# Patient Record
Sex: MALE | Race: ASIAN | Employment: FULL TIME | URBAN - METROPOLITAN AREA
[De-identification: names, ages, dates, MRNs, and addresses within clinical notes are randomized per-mention and may not be internally consistent; named-entity substitution may affect disease eponyms.]

---

## 2017-01-04 ENCOUNTER — APPOINTMENT (OUTPATIENT)
Dept: GENERAL RADIOLOGY | Age: 67
End: 2017-01-04
Attending: FAMILY MEDICINE

## 2017-01-04 ENCOUNTER — HOSPITAL ENCOUNTER (OUTPATIENT)
Age: 67
Discharge: HOME | End: 2017-01-04
Attending: FAMILY MEDICINE

## 2017-01-04 VITALS
TEMPERATURE: 98 F | RESPIRATION RATE: 18 BRPM | DIASTOLIC BLOOD PRESSURE: 82 MMHG | HEART RATE: 72 BPM | OXYGEN SATURATION: 98 % | SYSTOLIC BLOOD PRESSURE: 150 MMHG

## 2017-01-04 DIAGNOSIS — R29.898 SHOULDER WEAKNESS: Primary | ICD-10-CM

## 2017-01-04 DIAGNOSIS — S46.001A ROTATOR CUFF INJURY, RIGHT, INITIAL ENCOUNTER: ICD-10-CM

## 2017-01-04 PROCEDURE — 73030 X-RAY EXAM OF SHOULDER: CPT

## 2017-01-04 PROCEDURE — 99213 OFFICE O/P EST LOW 20 MIN: CPT

## 2017-01-04 PROCEDURE — 99203 OFFICE O/P NEW LOW 30 MIN: CPT

## 2017-01-04 RX ORDER — NAPROXEN 500 MG/1
500 TABLET ORAL 2 TIMES DAILY PRN
Qty: 20 TABLET | Refills: 0 | Status: SHIPPED | OUTPATIENT
Start: 2017-01-04 | End: 2017-01-11

## 2017-01-04 NOTE — ED PROVIDER NOTES
Patient Seen in: [facility]    History   Patient presents with:  Upper Extremity Injury (musculoskeletal)    Stated Complaint: right arm pain     LITA Norton is a 77year old male with no previous medical problems Device 01/04/17 0922 None (Room air)       Current:/82 mmHg  Pulse 72  Temp(Src) 98.1 °F (36.7 °C) (Temporal)  Resp 18  SpO2 98%        Physical Exam   Constitutional: He is oriented to person, place, and time.  He appears well-developed and well-nour see a shoulder specialist for further evaluation and treatment. Patient has appointment to see PCP today. Would recommend an MRI of the shoulder.,  Physical therapy, orthopedic evaluation.   If has any worsening symptoms, new weakness, numbness or tinglin

## 2017-01-04 NOTE — ED INITIAL ASSESSMENT (HPI)
The patient is here with complaints of right arm pain that started late last night.   He states he took aleve which helped his pain but now he isn't able to  He states he was trying to move luggage yesterday, when he was lifting something and then had pain

## 2017-01-05 PROBLEM — M75.121 COMPLETE TEAR OF RIGHT ROTATOR CUFF: Status: ACTIVE | Noted: 2017-01-05

## 2017-01-05 NOTE — ED NOTES
Spoke w pt, states has 2 pm appointment w keenan today. Discussed xray findings @ [facility]. Advised w have further eval & instructions once seen by ortho.

## 2017-01-19 RX ORDER — NAPROXEN 250 MG/1
250 TABLET ORAL AS NEEDED
COMMUNITY

## 2017-01-27 NOTE — H&P
[facility]    PATIENT'S NAME: Lucinda Morton   ATTENDING PHYSICIAN: Ravindra Patel M.D.    PATIENT ACCOUNT#:   [de-identified]    LOCATION:  [facility]  MEDICAL RECORD #:   [id number]       YOB: 1950  ADMISSION DATE:       01/31/2017    HIST rotator cuff repair. Limitations, risks, complications, postoperative scenarios are stressed. Possibility of continued pain, stiffness, vascular compromise, DVT, infection, hemarthrosis, surgical failure is understood. He is well counseled.   He will go

## 2017-01-31 ENCOUNTER — ANESTHESIA EVENT (OUTPATIENT)
Dept: SURGERY | Facility: HOSPITAL | Age: 67
End: 2017-01-31

## 2017-01-31 ENCOUNTER — ANESTHESIA (OUTPATIENT)
Dept: SURGERY | Facility: HOSPITAL | Age: 67
End: 2017-01-31

## 2017-01-31 ENCOUNTER — HOSPITAL ENCOUNTER (OUTPATIENT)
Facility: HOSPITAL | Age: 67
Setting detail: HOSPITAL OUTPATIENT SURGERY
Discharge: HOME | End: 2017-01-31
Attending: ORTHOPAEDIC SURGERY | Admitting: ORTHOPAEDIC SURGERY
Payer: COMMERCIAL

## 2017-01-31 ENCOUNTER — SURGERY (OUTPATIENT)
Age: 67
End: 2017-01-31

## 2017-01-31 VITALS
DIASTOLIC BLOOD PRESSURE: 85 MMHG | WEIGHT: 149.69 LBS | TEMPERATURE: 98 F | RESPIRATION RATE: 16 BRPM | SYSTOLIC BLOOD PRESSURE: 142 MMHG | BODY MASS INDEX: 25.56 KG/M2 | HEART RATE: 87 BPM | HEIGHT: 64 IN | OXYGEN SATURATION: 96 %

## 2017-01-31 DIAGNOSIS — M75.121 COMPLETE TEAR OF RIGHT ROTATOR CUFF: Primary | ICD-10-CM

## 2017-01-31 PROCEDURE — 81025 URINE PREGNANCY TEST: CPT | Performed by: ORTHOPAEDIC SURGERY

## 2017-01-31 PROCEDURE — 76942 ECHO GUIDE FOR BIOPSY: CPT | Performed by: ORTHOPAEDIC SURGERY

## 2017-01-31 PROCEDURE — 0LM14ZZ REATTACHMENT OF RIGHT SHOULDER TENDON, PERCUTANEOUS ENDOSCOPIC APPROACH: ICD-10-PCS | Performed by: ORTHOPAEDIC SURGERY

## 2017-01-31 PROCEDURE — 0RNJ4ZZ RELEASE RIGHT SHOULDER JOINT, PERCUTANEOUS ENDOSCOPIC APPROACH: ICD-10-PCS | Performed by: ORTHOPAEDIC SURGERY

## 2017-01-31 DEVICE — SYSTEM ESCP FX 19.1MM 4.75MM: Type: IMPLANTABLE DEVICE | Site: SHOULDER | Status: FUNCTIONAL

## 2017-01-31 RX ORDER — HYDROMORPHONE HYDROCHLORIDE 1 MG/ML
0.4 INJECTION, SOLUTION INTRAMUSCULAR; INTRAVENOUS; SUBCUTANEOUS EVERY 5 MIN PRN
Status: DISCONTINUED | OUTPATIENT
Start: 2017-01-31 | End: 2017-01-31

## 2017-01-31 RX ORDER — ACETAMINOPHEN 500 MG
1000 TABLET ORAL ONCE AS NEEDED
Status: DISCONTINUED | OUTPATIENT
Start: 2017-01-31 | End: 2017-01-31

## 2017-01-31 RX ORDER — SODIUM CHLORIDE, SODIUM LACTATE, POTASSIUM CHLORIDE, CALCIUM CHLORIDE 600; 310; 30; 20 MG/100ML; MG/100ML; MG/100ML; MG/100ML
INJECTION, SOLUTION INTRAVENOUS CONTINUOUS
Status: DISCONTINUED | OUTPATIENT
Start: 2017-01-31 | End: 2017-01-31

## 2017-01-31 RX ORDER — BUPIVACAINE HYDROCHLORIDE AND EPINEPHRINE 5; 5 MG/ML; UG/ML
INJECTION, SOLUTION EPIDURAL; INTRACAUDAL; PERINEURAL AS NEEDED
Status: DISCONTINUED | OUTPATIENT
Start: 2017-01-31 | End: 2017-01-31 | Stop reason: HOSPADM

## 2017-01-31 RX ORDER — DIPHENHYDRAMINE HYDROCHLORIDE 50 MG/ML
12.5 INJECTION INTRAMUSCULAR; INTRAVENOUS AS NEEDED
Status: DISCONTINUED | OUTPATIENT
Start: 2017-01-31 | End: 2017-01-31

## 2017-01-31 RX ORDER — HYDROCODONE BITARTRATE AND ACETAMINOPHEN 5; 325 MG/1; MG/1
2 TABLET ORAL AS NEEDED
Status: DISCONTINUED | OUTPATIENT
Start: 2017-01-31 | End: 2017-01-31

## 2017-01-31 RX ORDER — NALOXONE HYDROCHLORIDE 0.4 MG/ML
80 INJECTION, SOLUTION INTRAMUSCULAR; INTRAVENOUS; SUBCUTANEOUS AS NEEDED
Status: DISCONTINUED | OUTPATIENT
Start: 2017-01-31 | End: 2017-01-31

## 2017-01-31 RX ORDER — MORPHINE SULFATE 2 MG/ML
2 INJECTION, SOLUTION INTRAMUSCULAR; INTRAVENOUS EVERY 5 MIN PRN
Status: DISCONTINUED | OUTPATIENT
Start: 2017-01-31 | End: 2017-01-31

## 2017-01-31 RX ORDER — HYDROCODONE BITARTRATE AND ACETAMINOPHEN 5; 325 MG/1; MG/1
TABLET ORAL
Qty: 60 TABLET | Refills: 0 | Status: SHIPPED | OUTPATIENT
Start: 2017-01-31 | End: 2017-03-15 | Stop reason: ALTCHOICE

## 2017-01-31 RX ORDER — MEPERIDINE HYDROCHLORIDE 25 MG/ML
12.5 INJECTION INTRAMUSCULAR; INTRAVENOUS; SUBCUTANEOUS AS NEEDED
Status: DISCONTINUED | OUTPATIENT
Start: 2017-01-31 | End: 2017-01-31

## 2017-01-31 RX ORDER — METOCLOPRAMIDE HYDROCHLORIDE 5 MG/ML
10 INJECTION INTRAMUSCULAR; INTRAVENOUS AS NEEDED
Status: DISCONTINUED | OUTPATIENT
Start: 2017-01-31 | End: 2017-01-31

## 2017-01-31 RX ORDER — ONDANSETRON 2 MG/ML
4 INJECTION INTRAMUSCULAR; INTRAVENOUS AS NEEDED
Status: DISCONTINUED | OUTPATIENT
Start: 2017-01-31 | End: 2017-01-31

## 2017-01-31 RX ORDER — HYDROCODONE BITARTRATE AND ACETAMINOPHEN 5; 325 MG/1; MG/1
1 TABLET ORAL AS NEEDED
Status: DISCONTINUED | OUTPATIENT
Start: 2017-01-31 | End: 2017-01-31

## 2017-01-31 NOTE — ANESTHESIA PREPROCEDURE EVALUATION
PRE-OP EVALUATION    Patient Name: Kirstie De La Vega    Pre-op Diagnosis: ROTATOR CUFF TEAR  RIGHT SHOULDER     Procedure(s):  ARTHROSCOPIC ACROMIOPLASTY ROTATOR CUFF REPAIR RIGHT SHOULDER      Surgeon(s) and Role:     * Valencia Rees MD - Primary    Pre- ROS.    Exercise tolerance: good     MET: >4                                           Endo/Other    Negative endo/other ROS. Pulmonary    Negative pulmonary ROS. Neuro/Psych    Negative neuro/psych ROS.

## 2017-01-31 NOTE — INTERVAL H&P NOTE
Pre-op Diagnosis: ROTATOR CUFF TEAR  RIGHT SHOULDER     The above referenced H&P was reviewed by Irean Collet, MD on 1/31/2017, the patient was examined and no significant changes have occurred in the patient's condition since the H&P was performed.   I

## 2017-01-31 NOTE — ANESTHESIA POSTPROCEDURE EVALUATION
[place] Patient Status:  Hospital Outpatient Surgery   Age/Gender 77year old male MRN [id number]   Location [address] [place] [address] Attending Richard Wilcox MD   Hosp Day # 0 PCP Shannen Baker MD

## 2017-01-31 NOTE — OPERATIVE REPORT
[facility]    PATIENT'S NAME: Yovana Guerra   ATTENDING PHYSICIAN: Digna Leung M.D. OPERATING PHYSICIAN: Digna Leung M.D.    PATIENT ACCOUNT#:   [de-identified]    LOCATION:  PREOPASCC EH PRE ASCC 21 EDWP 10  MEDICAL RECORD #:   [id number] extremity is initialed by me in preop holding. The usual posterior arthroscopic viewing portal is established. Glenohumeral articulation is seen. The subscapularis is normal.  The articular surfaces show limited arthritis.   There is minimal degenerative

## 2017-01-31 NOTE — BRIEF OP NOTE
[facility]  Brief Op Note     Minoo King Location: OR   [facility] [id number] MRN [id number]   Admission Date 1/31/2017 Operation Date 1/31/2017   Attending Physician Pooja Sidhu MD Operating Physician Ludmila Santiago MD       Pre-Operative D

## 2017-02-01 PROBLEM — Z47.89 ORTHOPEDIC AFTERCARE: Status: ACTIVE | Noted: 2017-02-01

## 2017-02-21 ENCOUNTER — APPOINTMENT (OUTPATIENT)
Dept: PHYSICAL THERAPY | Age: 67
End: 2017-02-21
Payer: COMMERCIAL

## 2017-02-28 ENCOUNTER — APPOINTMENT (OUTPATIENT)
Dept: PHYSICAL THERAPY | Age: 67
End: 2017-02-28
Attending: NURSE PRACTITIONER
Payer: COMMERCIAL

## 2017-03-07 ENCOUNTER — OFFICE VISIT (OUTPATIENT)
Dept: PHYSICAL THERAPY | Age: 67
End: 2017-03-07
Attending: NURSE PRACTITIONER
Payer: COMMERCIAL

## 2017-03-07 DIAGNOSIS — Z47.89 ORTHOPEDIC AFTERCARE: Primary | ICD-10-CM

## 2017-03-07 PROCEDURE — 97110 THERAPEUTIC EXERCISES: CPT

## 2017-03-07 PROCEDURE — 97162 PT EVAL MOD COMPLEX 30 MIN: CPT

## 2017-03-07 NOTE — PROGRESS NOTES
POST-OP SHOULDER EVALUATION:   Referring Physician: Dr. Haydee Lomas  Diagnosis: s/p rotator cuff repair (supraspinatus)  Orthopedic aftercare (Z47.89)     Date of Service: 3/7/2017     PATIENT SUMMARY   Nguyễn Murphy is a 77year old y/o male who presents to <3/5;  IR: R <3/5;  Rhomboids: R<3/5,   Mid trap: R <3/5; Today’s Treatment and Response: PT eval completed. HEP taught, performed and issued.       Charges: PT Eval Moderate Complexity, Ex 1      Total Timed Treatment: 20 min     Total Treatment Joby Durham soon as possible to [phone].  If you have any questions, please contact me at Dept: [phone]    Sincerely,  Electronically signed by therapist: Lindy Davis PT    Physician's certification required: Yes  I certify the need for these services furn

## 2017-03-09 ENCOUNTER — OFFICE VISIT (OUTPATIENT)
Dept: PHYSICAL THERAPY | Age: 67
End: 2017-03-09
Attending: NURSE PRACTITIONER
Payer: COMMERCIAL

## 2017-03-09 PROCEDURE — 97110 THERAPEUTIC EXERCISES: CPT

## 2017-03-09 PROCEDURE — 97014 ELECTRIC STIMULATION THERAPY: CPT

## 2017-03-09 NOTE — PROGRESS NOTES
Dx: S/p r rotator cuff repair         Authorized # of Visits:   12        Next MD visit: none scheduled  Fall Risk: standard         Precautions: n/a             Subjective: No new problems.   Working on [employer] and feeling like overhead movement is improving b Continue POC. Progress as tolerated. Date: 3/9/2017  Tx#: 2/12 Date: Tx#: 3/ Date: Tx#: 4/ Date: Tx#: 5/ Date: Tx#: 6/ Date: Tx#: 7/ Date:    Tx#: 8/   OH pulleys: flex, abd x10 ea         Standing: AAROM right shoulder abd in scapular plane x1

## 2017-03-14 ENCOUNTER — OFFICE VISIT (OUTPATIENT)
Dept: PHYSICAL THERAPY | Age: 67
End: 2017-03-14
Attending: NURSE PRACTITIONER
Payer: COMMERCIAL

## 2017-03-14 PROCEDURE — 97014 ELECTRIC STIMULATION THERAPY: CPT

## 2017-03-14 PROCEDURE — 97110 THERAPEUTIC EXERCISES: CPT

## 2017-03-14 NOTE — PROGRESS NOTES
Progress Summary    Zayra Della is 6 weeks post-op and has attended 3, cancelled 0, and no shown 0 visits in Physical Therapy.   He is making good progress with shoulder flex and ER ROM but his abduction ROM is significantly limited, even in the scapular plane, and treatment options and has agreed to actively participate in planning and for this course of care. Thank you for your referral. If you have any questions, please contact me at Dept: [phone].     Sincerely,  Electronically signed by therapist: Christiano Kruger

## 2017-03-15 PROBLEM — Z98.890 S/P ROTATOR CUFF REPAIR: Status: ACTIVE | Noted: 2017-03-15

## 2017-03-16 ENCOUNTER — OFFICE VISIT (OUTPATIENT)
Dept: PHYSICAL THERAPY | Age: 67
End: 2017-03-16
Attending: NURSE PRACTITIONER
Payer: COMMERCIAL

## 2017-03-16 PROCEDURE — 97110 THERAPEUTIC EXERCISES: CPT

## 2017-03-16 PROCEDURE — 97014 ELECTRIC STIMULATION THERAPY: CPT

## 2017-03-16 NOTE — PROGRESS NOTES
Dx: S/p r rotator cuff repair         Authorized # of Visits:   12        Next MD visit: none scheduled  Fall Risk: standard         Precautions: n/a             Subjective: No new problems.   Saw orthopedic yesterday and he is pleased with progress thus fa Progress  Pt will be independent and compliant with comprehensive HEP to maintain progress achieved in PT    Plan: Continue POC. Progress as tolerated. Date: 3/9/2017  Tx#: 2/12 Date: 3/14/2017  Tx#: 3/12 Date: 3/16/2017  Tx#: 4/ Date:    Tx#: 5/ Date:

## 2017-03-21 ENCOUNTER — OFFICE VISIT (OUTPATIENT)
Dept: PHYSICAL THERAPY | Age: 67
End: 2017-03-21
Attending: NURSE PRACTITIONER
Payer: COMMERCIAL

## 2017-03-21 PROCEDURE — 97110 THERAPEUTIC EXERCISES: CPT

## 2017-03-23 ENCOUNTER — OFFICE VISIT (OUTPATIENT)
Dept: PHYSICAL THERAPY | Age: 67
End: 2017-03-23
Attending: NURSE PRACTITIONER
Payer: COMMERCIAL

## 2017-03-23 PROCEDURE — 97110 THERAPEUTIC EXERCISES: CPT

## 2017-03-23 NOTE — PROGRESS NOTES
Dx: S/p r rotator cuff repair         Authorized # of Visits:   12        Next MD visit: April 19th  Fall Risk: standard         Precautions: n/a             Subjective: No new problems. Working on [employer] 3x/day and continues to feel improvement.   Has a ques to maintain progress achieved in PT. In Progress    Plan: Continue POC. Progress as tolerated. Date: 3/9/2017  Tx#: 2/12 Date: 3/14/2017  Tx#: 3/12 Date: 3/16/2017  Tx#: 4/ Date: 3/21/2017  Tx#: 5/12  Week 7 post op Date: 3/23/2017  Tx#: 6/ Date:    Tx#: isometrics: flex, IR, ER, Ext x10 ea R sh isometrics: flex, IR, ER, Ext x10 ea     Estim/IFC  Hz x15 min right shoulder for pain control Estim/IFC  Hz x15 min right shoulder for pain control Estim/IFC  Hz x15 min right shoulder for pain c

## 2017-03-28 ENCOUNTER — OFFICE VISIT (OUTPATIENT)
Dept: PHYSICAL THERAPY | Age: 67
End: 2017-03-28
Attending: NURSE PRACTITIONER
Payer: COMMERCIAL

## 2017-03-28 PROCEDURE — 97110 THERAPEUTIC EXERCISES: CPT

## 2017-03-28 NOTE — PROGRESS NOTES
Dx: S/p r rotator cuff repair         Authorized # of Visits:   12        Next MD visit: April 19th  Fall Risk: standard         Precautions: n/a             Subjective: No new problems. Working on [employer] 3x/day and continues to feel improvement.   Feels he i In Progress    Plan: Continue POC. Progress as tolerated.     Date: 3/9/2017  Tx#: 2/12 Date: 3/14/2017  Tx#: 3/12 Date: 3/16/2017  Tx#: 4/ Date: 3/21/2017  Tx#: 5/12  Week 7 post op Date: 3/23/2017  Tx#: 6/ Date: 3/28/2017  Tx#: 7/  Post-op week #8 Date: Supine: right shoulder flexion to 90 deg 2x10    L s/l: ER 2x10  Abd: 2x10 Supine: right shoulder flexion to full range 2x10    L s/l: ER 2x10  Abd: 2x10      l s/l: scapular mobilization l s/l: scapular mobilization l s/l: scapular mobilization l s/l: sca

## 2017-03-30 ENCOUNTER — APPOINTMENT (OUTPATIENT)
Dept: PHYSICAL THERAPY | Age: 67
End: 2017-03-30
Attending: NURSE PRACTITIONER
Payer: COMMERCIAL

## 2017-04-04 ENCOUNTER — APPOINTMENT (OUTPATIENT)
Dept: PHYSICAL THERAPY | Age: 67
End: 2017-04-04
Attending: NURSE PRACTITIONER
Payer: COMMERCIAL

## 2017-04-06 ENCOUNTER — OFFICE VISIT (OUTPATIENT)
Dept: PHYSICAL THERAPY | Age: 67
End: 2017-04-06
Attending: NURSE PRACTITIONER
Payer: COMMERCIAL

## 2017-04-06 PROCEDURE — 97110 THERAPEUTIC EXERCISES: CPT

## 2017-04-06 NOTE — PROGRESS NOTES
Dx: S/p r rotator cuff repair         Authorized # of Visits:   12        Next MD visit: April 19th  Fall Risk: standard         Precautions: n/a             Subjective: No new problems.  Reports that he has been working on HEP and shoulder is feeling a lot independent and compliant with comprehensive HEP to maintain progress achieved in PT. In Progress    Plan: Continue POC. Progress as tolerated.     Date: 3/9/2017  Tx#: 2/12 Date: 3/14/2017  Tx#: 3/12 Date: 3/16/2017  Tx#: 4/ Date: 3/21/2017  Tx#: 5/12  Netta flexion with cane x10, ER x10 Supine: AA oh flexion with cane x10, ER x10 Supine: AA oh flexion with cane x10, ER x10 Supine: AA oh flexion with cane x15, ER x15 Supine: AA oh flexion with cane x20, ER x20 -- Scapular rows YTB x10    R SH ER/IR YTB x10 ea

## 2017-04-10 ENCOUNTER — OFFICE VISIT (OUTPATIENT)
Dept: PHYSICAL THERAPY | Age: 67
End: 2017-04-10
Attending: NURSE PRACTITIONER
Payer: COMMERCIAL

## 2017-04-10 PROCEDURE — 97110 THERAPEUTIC EXERCISES: CPT

## 2017-04-10 NOTE — PROGRESS NOTES
Dx: S/p r rotator cuff repair         Authorized # of Visits:   12        Next MD visit: April 19th  Fall Risk: standard         Precautions: n/a             Subjective: No new problems. Reports that he is doing the scapular rows and they feel fine.  He was stabilization with ADL such as lifting and reaching. In Progress  · Pt will be independent and compliant with comprehensive HEP to maintain progress achieved in PT. In Progress    Plan: Continue POC. Progress as tolerated.     Date: 3/9/2017  Tx#: 2/12 Zeb shoulder: flex, abd (scapular plane), ER x45 min PROM right shoulder: flex, abd (scapular plane), ER x30 min PROM right shoulder: flex, abd (scapular plane), ER x20 min PROM right shoulder: flex, abd (scapular plane), ER x10 min PROM right shoulder: flex, Treatment Time: 55 min

## 2017-04-17 ENCOUNTER — APPOINTMENT (OUTPATIENT)
Dept: PHYSICAL THERAPY | Age: 67
End: 2017-04-17
Attending: NURSE PRACTITIONER
Payer: COMMERCIAL

## 2017-04-18 ENCOUNTER — OFFICE VISIT (OUTPATIENT)
Dept: PHYSICAL THERAPY | Age: 67
End: 2017-04-18
Attending: NURSE PRACTITIONER
Payer: COMMERCIAL

## 2017-04-18 PROCEDURE — 97110 THERAPEUTIC EXERCISES: CPT

## 2017-04-18 NOTE — PROGRESS NOTES
Progress Summary    Pt has attended 10, cancelled 0, and no shown 0 visits in Physical Therapy. Radhames Miller is 11 weeks post-op and reports feeling 70% improvement in pain and function since starting therapy.   Reports he is able to use right arm for most ADL good    Plan: Continue skilled Physical Therapy POC 1 x/week or a total of 4 more visits over a 90 day period. Progress strengthening as tolerated.      Patient/Family/Caregiver was advised of these findings, precautions, and treatment options and has agr IR (HBB) x10 ea     Wall slides flex x10 Wall slides flex x10 Wall slides flex x10 Wall slides flex x10 Wall slides flex x10 Wall press ups x10 Wall press ups x10   Standing: AAROM right shoulder abd in scapular plane x10 Standing: AAROM right shoulder abd YTB x15    R SH ER/IR YTB x12 ea          Right bicep curl 2# x10 Right bicep curl 3# 2x10   PROM right elbow: flex/ext x15 AROM right elbow: flex/ext x15 AROM right elbow: flex/ext x15 Supine: right shoulder flexion to 90 deg x15  L s/l: ER x15 Supine: ri

## 2017-04-24 ENCOUNTER — OFFICE VISIT (OUTPATIENT)
Dept: PHYSICAL THERAPY | Age: 67
End: 2017-04-24
Attending: NURSE PRACTITIONER
Payer: COMMERCIAL

## 2017-04-24 PROCEDURE — 97110 THERAPEUTIC EXERCISES: CPT

## 2017-04-24 NOTE — PROGRESS NOTES
Dx: S/p r rotator cuff repair         Authorized # of Visits:   12        Next MD visit: April 19th  Fall Risk: standard         Precautions: n/a             Subjective: No new problems. Reports that he saw orthopedic last week.  Pleased with progress but d Standing: AAROM right shoulder abd in scapular plane x10 Standing: AAROM right shoulder flex and  abd in scapular plane with cane x10 Standing: AAROM right shoulder flex and  abd in scapular plane with cane, extension, IR (HBB) x10 Standing: AAROM right sh shoulder tricep ext 2# x10   PROM right elbow: flex/ext x15 AROM right elbow: flex/ext x15 AROM right elbow: flex/ext x15 Supine: right shoulder flexion to 90 deg x15  L s/l: ER x15 Supine: right shoulder flexion to 90 deg 2x10    L s/l: ER 2x10  Abd: 2x10

## 2017-05-01 ENCOUNTER — APPOINTMENT (OUTPATIENT)
Dept: PHYSICAL THERAPY | Age: 67
End: 2017-05-01
Attending: NURSE PRACTITIONER
Payer: COMMERCIAL

## 2017-05-02 ENCOUNTER — OFFICE VISIT (OUTPATIENT)
Dept: PHYSICAL THERAPY | Age: 67
End: 2017-05-02
Attending: NURSE PRACTITIONER
Payer: COMMERCIAL

## 2017-05-02 PROCEDURE — 97110 THERAPEUTIC EXERCISES: CPT

## 2017-05-02 NOTE — PROGRESS NOTES
Dx: S/p r rotator cuff repair         Authorized # of Visits:   12        Next MD visit: May 31  Fall Risk: standard         Precautions: n/a             Subjective: No new problems. Reports that he is doing well. Still waking during sleep due to pain.   R press ups x10 Wall press ups x20   Standing: AAROM right shoulder abd in scapular plane x10 Standing: AAROM right shoulder abd in scapular plane x10 Standing: AAROM right shoulder flex and  abd in scapular plane with cane x10 Standing: AAROM right shoulder x15    R SH ER/IR YTB x12 ea Scapular rows and ext RTB x15    R SH ER/IR  YTB x12 ea Scapular rows and ext RTB x20    R SH ER/IR  RTB 2x10 ea          Right bicep curl 2# x10 Right bicep curl 3# 2x10 Right bicep curl 4# 2x10 Right bicep curl 4# 2x10

## 2017-05-08 ENCOUNTER — OFFICE VISIT (OUTPATIENT)
Dept: PHYSICAL THERAPY | Age: 67
End: 2017-05-08
Attending: NURSE PRACTITIONER
Payer: COMMERCIAL

## 2017-05-08 ENCOUNTER — APPOINTMENT (OUTPATIENT)
Dept: PHYSICAL THERAPY | Age: 67
End: 2017-05-08
Attending: NURSE PRACTITIONER
Payer: COMMERCIAL

## 2017-05-08 PROCEDURE — 97110 THERAPEUTIC EXERCISES: CPT

## 2017-05-08 NOTE — PROGRESS NOTES
Dx: S/p r rotator cuff repair         Authorized # of Visits:   12        Next MD visit: May 31  Fall Risk: standard         Precautions: n/a             Subjective: No new problems. Reports that he continues to feel better.  Still waking during sleep due t ext RTB x20    R SH ER/IR  RTB 2x10 ea Scapular rows and ext GTB x20    R SH ER RTB 2x10     IR GTB 2x10   Right bicep curl 3# 2x10 Right bicep curl 4# 2x10 Right bicep curl 4# 2x10 Right bicep curl 5# 2x10    Right shoulder tricep ext 2# x10 --    Supine:

## 2017-05-15 ENCOUNTER — OFFICE VISIT (OUTPATIENT)
Dept: PHYSICAL THERAPY | Age: 67
End: 2017-05-15
Attending: NURSE PRACTITIONER
Payer: COMMERCIAL

## 2017-05-15 PROCEDURE — 97110 THERAPEUTIC EXERCISES: CPT

## 2017-05-15 NOTE — PROGRESS NOTES
Dx: S/p r rotator cuff repair         Authorized # of Visits:   12        Next MD visit: May 31  Fall Risk: standard         Precautions: n/a             Subjective: No new problems.  Reports that he is feeling better and able to do most ADL with minimal to Right sh ER in 90 abd 1#  2x10 Seated: Right sh ER in 90 abd 1#  2x10 Seated: Right sh ER in 90 abd 2#  2x10   PROM right shoulder: ER x 5 min PROM right shoulder: ER x 5 min  PROM right shoulder: ER x 5 min --   Scapular rows YTB x15    R SH ER/IR YTB x12

## 2017-05-22 ENCOUNTER — OFFICE VISIT (OUTPATIENT)
Dept: PHYSICAL THERAPY | Age: 67
End: 2017-05-22
Attending: NURSE PRACTITIONER
Payer: COMMERCIAL

## 2017-05-22 PROCEDURE — 97110 THERAPEUTIC EXERCISES: CPT

## 2017-05-22 NOTE — PROGRESS NOTES
Progress Summary    Pt has attended 15 visits in Physical Therapy. Justina Espinal reports feeling 80% improvement in pain and function since starting therapy. He reports ability to perform most ADL with minimal to no c/o pain.   He is still reporting sleep distur x 1 more visit to finalize HEP. Patient/Family/Caregiver was advised of these findings, precautions, and treatment options and has agreed to actively participate in planning and for this course of care.     Thank you for your referral. If you have an Scapular rows YTB x15    R SH ER/IR YTB x12 ea Scapular rows and ext RTB x15    R SH ER/IR  YTB x12 ea Scapular rows and ext RTB x20    R SH ER/IR  RTB 2x10 ea Scapular rows and ext GTB x20    R SH ER RTB 2x10     IR GTB 2x10 Scapular rows and ext GTB x2

## 2017-05-30 ENCOUNTER — APPOINTMENT (OUTPATIENT)
Dept: PHYSICAL THERAPY | Age: 67
End: 2017-05-30
Attending: NURSE PRACTITIONER
Payer: COMMERCIAL

## 2017-10-28 ENCOUNTER — LAB ENCOUNTER (OUTPATIENT)
Dept: LAB | Age: 67
End: 2017-10-28
Attending: FAMILY MEDICINE
Payer: COMMERCIAL

## 2017-10-28 DIAGNOSIS — Z12.5 SPECIAL SCREENING FOR MALIGNANT NEOPLASM OF PROSTATE: ICD-10-CM

## 2017-10-28 DIAGNOSIS — Z00.00 ROUTINE GENERAL MEDICAL EXAMINATION AT A HEALTH CARE FACILITY: Primary | ICD-10-CM

## 2017-10-28 DIAGNOSIS — Z13.89 ENCOUNTER FOR ROUTINE SCREENING FOR MALFORMATION USING ULTRASONICS: ICD-10-CM

## 2017-10-28 DIAGNOSIS — R53.83 FATIGUE: ICD-10-CM

## 2017-10-28 DIAGNOSIS — Z13.220 SCREENING FOR LIPOID DISORDERS: ICD-10-CM

## 2017-10-28 DIAGNOSIS — R12 HEARTBURN: ICD-10-CM

## 2017-10-28 DIAGNOSIS — R73.09 OTHER ABNORMAL GLUCOSE: ICD-10-CM

## 2017-10-28 PROCEDURE — 84153 ASSAY OF PSA TOTAL: CPT

## 2017-10-28 PROCEDURE — 85025 COMPLETE CBC W/AUTO DIFF WBC: CPT

## 2017-10-28 PROCEDURE — 83036 HEMOGLOBIN GLYCOSYLATED A1C: CPT

## 2017-10-28 PROCEDURE — 80061 LIPID PANEL: CPT

## 2017-10-28 PROCEDURE — 84443 ASSAY THYROID STIM HORMONE: CPT

## 2017-10-28 PROCEDURE — 80053 COMPREHEN METABOLIC PANEL: CPT

## 2018-01-25 ENCOUNTER — CHARTING TRANS (OUTPATIENT)
Dept: OTHER | Age: 68
End: 2018-01-25

## 2018-01-27 ENCOUNTER — LAB SERVICES (OUTPATIENT)
Dept: OTHER | Age: 68
End: 2018-01-27

## 2018-01-27 LAB
ALBUMIN SERPL BCG-MCNC: 4 G/DL (ref 3.6–5.1)
ALP SERPL-CCNC: 83 U/L (ref 30–130)
ALT SERPL W/O P-5'-P-CCNC: 16 U/L (ref 10–35)
AST SERPL-CCNC: 17 U/L (ref 9–37)
BILIRUB SERPL-MCNC: 0.7 MG/DL (ref 0–1)
BUN SERPL-MCNC: 12 MG/DL (ref 6–27)
CALCIUM SERPL-MCNC: 8.9 MG/DL (ref 8.6–10.6)
CHLORIDE SERPL-SCNC: 101 MMOL/L (ref 96–107)
CHOLEST SERPL-MCNC: 144 MG/DL (ref 140–200)
CREATININE, SERUM: 1.3 MG/DL (ref 0.6–1.6)
DIFFERENTIAL TYPE: NORMAL
GFR SERPL CREATININE-BSD FRML MDRD: 53 ML/MIN/{1.73M2}
GFR SERPL CREATININE-BSD FRML MDRD: >60 ML/MIN/{1.73M2}
GLUCOSE P FAST SERPL-MCNC: 96 MG/DL (ref 60–100)
HCO3 SERPL-SCNC: 26 MMOL/L (ref 22–32)
HDLC SERPL-MCNC: 45 MG/DL
HEMATOCRIT: 45.4 % (ref 40–51)
HEMOGLOBIN: 15.9 G/DL (ref 13.7–17.5)
LDLC SERPL CALC-MCNC: 85 MG/DL (ref 0–100)
LYMPH PERCENT: 27.8 % (ref 20.5–51.1)
LYMPHOCYTE ABSOLUTE #: 1.5 10*3/UL (ref 1.2–3.4)
MEAN CORPUSCULAR HGB CONCENTRATION: 35 % (ref 32–36)
MEAN CORPUSCULAR HGB: 31.3 PG (ref 27–34)
MEAN CORPUSCULAR VOLUME: 89.4 FL (ref 79–95)
MEAN PLATELET VOLUME: 10.3 FL (ref 8.6–12.4)
MIXED %: 10.5 % (ref 4.3–12.9)
MIXED ABSOLUTE #: 0.6 10*3/UL (ref 0.2–0.9)
NEUTROPHIL ABSOLUTE #: 3.4 10*3/UL (ref 1.4–6.5)
NEUTROPHIL PERCENT: 61.7 % (ref 34–73.5)
PLATELET COUNT: 165 10*3/UL (ref 150–400)
POTASSIUM SERPL-SCNC: 4.4 MMOL/L (ref 3.5–5.3)
PROT SERPL-MCNC: 6.7 G/DL (ref 6.2–8.1)
PSA SERPL-MCNC: 1.32 NG/ML (ref 0–4.9)
RED BLOOD CELL COUNT: 5.08 10*6/UL (ref 3.9–5.7)
RED CELL DISTRIBUTION WIDTH: 12.8 % (ref 11.3–14.8)
SODIUM SERPL-SCNC: 136 MMOL/L (ref 136–146)
TRIGL SERPL-MCNC: 67 MG/DL (ref 0–200)
WHITE BLOOD CELL COUNT: 5.5 10*3/UL (ref 4–10)

## 2018-01-29 ENCOUNTER — LAB SERVICES (OUTPATIENT)
Dept: OTHER | Age: 68
End: 2018-01-29

## 2018-01-29 LAB — HEMOCCULT STL QL: NEGATIVE

## 2018-01-30 ENCOUNTER — MYAURORA ACCOUNT LINK (OUTPATIENT)
Dept: OTHER | Age: 68
End: 2018-01-30

## 2018-01-30 ENCOUNTER — CHARTING TRANS (OUTPATIENT)
Dept: OTHER | Age: 68
End: 2018-01-30

## 2018-02-26 ENCOUNTER — CHARTING TRANS (OUTPATIENT)
Dept: OTHER | Age: 68
End: 2018-02-26

## 2018-02-27 ENCOUNTER — CHARTING TRANS (OUTPATIENT)
Dept: OTHER | Age: 68
End: 2018-02-27

## 2018-03-05 ENCOUNTER — CHARTING TRANS (OUTPATIENT)
Dept: OTHER | Age: 68
End: 2018-03-05

## 2018-03-06 ENCOUNTER — CHARTING TRANS (OUTPATIENT)
Dept: OTHER | Age: 68
End: 2018-03-06

## 2018-03-08 ENCOUNTER — CHARTING TRANS (OUTPATIENT)
Dept: OTHER | Age: 68
End: 2018-03-08

## 2018-03-13 ENCOUNTER — CHARTING TRANS (OUTPATIENT)
Dept: OTHER | Age: 68
End: 2018-03-13

## 2018-03-15 ENCOUNTER — CHARTING TRANS (OUTPATIENT)
Dept: OTHER | Age: 68
End: 2018-03-15

## 2018-03-20 ENCOUNTER — CHARTING TRANS (OUTPATIENT)
Dept: OTHER | Age: 68
End: 2018-03-20

## 2018-03-22 ENCOUNTER — CHARTING TRANS (OUTPATIENT)
Dept: OTHER | Age: 68
End: 2018-03-22

## 2018-03-26 ENCOUNTER — CHARTING TRANS (OUTPATIENT)
Dept: OTHER | Age: 68
End: 2018-03-26

## 2018-03-27 ENCOUNTER — CHARTING TRANS (OUTPATIENT)
Dept: OTHER | Age: 68
End: 2018-03-27

## 2018-03-29 ENCOUNTER — CHARTING TRANS (OUTPATIENT)
Dept: OTHER | Age: 68
End: 2018-03-29

## 2018-04-02 ENCOUNTER — CHARTING TRANS (OUTPATIENT)
Dept: OTHER | Age: 68
End: 2018-04-02

## 2018-04-03 ENCOUNTER — CHARTING TRANS (OUTPATIENT)
Dept: OTHER | Age: 68
End: 2018-04-03

## 2018-04-03 ENCOUNTER — MYAURORA ACCOUNT LINK (OUTPATIENT)
Dept: OTHER | Age: 68
End: 2018-04-03

## 2018-04-06 ENCOUNTER — CHARTING TRANS (OUTPATIENT)
Dept: OTHER | Age: 68
End: 2018-04-06

## 2018-05-02 ENCOUNTER — CHARTING TRANS (OUTPATIENT)
Dept: OTHER | Age: 68
End: 2018-05-02

## 2018-05-03 ENCOUNTER — CHARTING TRANS (OUTPATIENT)
Dept: OTHER | Age: 68
End: 2018-05-03

## 2018-05-07 ENCOUNTER — CHARTING TRANS (OUTPATIENT)
Dept: OTHER | Age: 68
End: 2018-05-07

## 2018-05-30 ENCOUNTER — CHARTING TRANS (OUTPATIENT)
Dept: OTHER | Age: 68
End: 2018-05-30

## 2018-05-30 ENCOUNTER — MYAURORA ACCOUNT LINK (OUTPATIENT)
Dept: OTHER | Age: 68
End: 2018-05-30

## 2018-06-25 ENCOUNTER — CHARTING TRANS (OUTPATIENT)
Dept: OTHER | Age: 68
End: 2018-06-25

## 2018-06-25 ENCOUNTER — MYAURORA ACCOUNT LINK (OUTPATIENT)
Dept: OTHER | Age: 68
End: 2018-06-25

## 2018-06-25 ENCOUNTER — LAB SERVICES (OUTPATIENT)
Dept: OTHER | Age: 68
End: 2018-06-25

## 2018-06-27 LAB — AP REPORT: NORMAL

## 2018-06-29 ENCOUNTER — CHARTING TRANS (OUTPATIENT)
Dept: OTHER | Age: 68
End: 2018-06-29

## 2018-08-08 ENCOUNTER — CHARTING TRANS (OUTPATIENT)
Dept: OTHER | Age: 68
End: 2018-08-08

## 2018-08-08 ENCOUNTER — MYAURORA ACCOUNT LINK (OUTPATIENT)
Dept: OTHER | Age: 68
End: 2018-08-08

## 2018-08-08 ENCOUNTER — IMAGING SERVICES (OUTPATIENT)
Dept: OTHER | Age: 68
End: 2018-08-08

## 2018-08-09 ENCOUNTER — CHARTING TRANS (OUTPATIENT)
Dept: OTHER | Age: 68
End: 2018-08-09

## 2018-08-13 ENCOUNTER — CHARTING TRANS (OUTPATIENT)
Dept: OTHER | Age: 68
End: 2018-08-13

## 2018-08-15 ENCOUNTER — MYAURORA ACCOUNT LINK (OUTPATIENT)
Dept: OTHER | Age: 68
End: 2018-08-15

## 2018-08-15 ENCOUNTER — CHARTING TRANS (OUTPATIENT)
Dept: OTHER | Age: 68
End: 2018-08-15

## 2018-08-30 ENCOUNTER — ANCILLARY ORDERS (OUTPATIENT)
Dept: OTHER | Age: 68
End: 2018-08-30

## 2018-08-30 DIAGNOSIS — I10 ESSENTIAL (PRIMARY) HYPERTENSION: ICD-10-CM

## 2018-08-30 DIAGNOSIS — M17.12 PRIMARY OSTEOARTHRITIS OF LEFT KNEE: ICD-10-CM

## 2018-08-30 DIAGNOSIS — E78.2 MIXED HYPERLIPIDEMIA: ICD-10-CM

## 2018-08-30 DIAGNOSIS — M25.562 PAIN IN LEFT KNEE: ICD-10-CM

## 2018-08-30 DIAGNOSIS — M25.862 OTHER SPECIFIED JOINT DISORDERS, LEFT KNEE: ICD-10-CM

## 2018-08-31 ENCOUNTER — MYAURORA ACCOUNT LINK (OUTPATIENT)
Dept: OTHER | Age: 68
End: 2018-08-31

## 2018-08-31 ENCOUNTER — CHARTING TRANS (OUTPATIENT)
Dept: OTHER | Age: 68
End: 2018-08-31

## 2018-09-04 ENCOUNTER — CHARTING TRANS (OUTPATIENT)
Dept: OTHER | Age: 68
End: 2018-09-04

## 2018-09-06 ENCOUNTER — MYAURORA ACCOUNT LINK (OUTPATIENT)
Dept: OTHER | Age: 68
End: 2018-09-06

## 2018-09-06 ENCOUNTER — CHARTING TRANS (OUTPATIENT)
Dept: OTHER | Age: 68
End: 2018-09-06

## 2018-10-17 ENCOUNTER — CHARTING TRANS (OUTPATIENT)
Dept: OTHER | Age: 68
End: 2018-10-17

## 2018-10-24 ENCOUNTER — CHARTING TRANS (OUTPATIENT)
Dept: OTHER | Age: 68
End: 2018-10-24

## 2018-11-01 ENCOUNTER — CHARTING TRANS (OUTPATIENT)
Dept: OTHER | Age: 68
End: 2018-11-01

## 2018-11-06 ENCOUNTER — CHARTING TRANS (OUTPATIENT)
Dept: OTHER | Age: 68
End: 2018-11-06

## 2018-11-08 ENCOUNTER — CHARTING TRANS (OUTPATIENT)
Dept: OTHER | Age: 68
End: 2018-11-08

## 2018-11-14 ENCOUNTER — MYAURORA ACCOUNT LINK (OUTPATIENT)
Dept: OTHER | Age: 68
End: 2018-11-14

## 2018-11-14 ENCOUNTER — CHARTING TRANS (OUTPATIENT)
Dept: OTHER | Age: 68
End: 2018-11-14

## 2018-11-19 ENCOUNTER — CHARTING TRANS (OUTPATIENT)
Dept: OTHER | Age: 68
End: 2018-11-19

## 2018-12-03 ENCOUNTER — OFFICE VISIT (OUTPATIENT)
Dept: SPORTS MEDICINE | Age: 68
End: 2018-12-03

## 2018-12-03 VITALS
BODY MASS INDEX: 25.61 KG/M2 | HEIGHT: 64 IN | DIASTOLIC BLOOD PRESSURE: 72 MMHG | HEART RATE: 70 BPM | SYSTOLIC BLOOD PRESSURE: 136 MMHG | WEIGHT: 150 LBS

## 2018-12-03 DIAGNOSIS — M25.562 CHRONIC PAIN OF LEFT KNEE: Primary | ICD-10-CM

## 2018-12-03 DIAGNOSIS — M17.12 ARTHRITIS OF LEFT KNEE: ICD-10-CM

## 2018-12-03 DIAGNOSIS — G89.29 CHRONIC PAIN OF LEFT KNEE: Primary | ICD-10-CM

## 2018-12-03 PROCEDURE — 3075F SYST BP GE 130 - 139MM HG: CPT | Performed by: FAMILY MEDICINE

## 2018-12-03 PROCEDURE — 99214 OFFICE O/P EST MOD 30 MIN: CPT | Performed by: FAMILY MEDICINE

## 2018-12-03 PROCEDURE — 3078F DIAST BP <80 MM HG: CPT | Performed by: FAMILY MEDICINE

## 2018-12-03 RX ORDER — MELOXICAM 7.5 MG/1
TABLET ORAL
Qty: 30 TABLET | Refills: 0 | Status: SHIPPED | OUTPATIENT
Start: 2018-12-03 | End: 2019-12-27 | Stop reason: ALTCHOICE

## 2018-12-03 RX ORDER — ASPIRIN 81 MG/1
81 TABLET ORAL
COMMUNITY

## 2018-12-03 RX ORDER — MAGNESIUM CARB/ALUMINUM HYDROX 105-160MG
TABLET,CHEWABLE ORAL
COMMUNITY
End: 2019-04-29

## 2018-12-03 SDOH — HEALTH STABILITY: MENTAL HEALTH: HOW OFTEN DO YOU HAVE A DRINK CONTAINING ALCOHOL?: NEVER

## 2018-12-19 ENCOUNTER — TELEPHONE (OUTPATIENT)
Dept: INTERNAL MEDICINE | Age: 68
End: 2018-12-19

## 2018-12-19 DIAGNOSIS — Z00.00 ROUTINE GENERAL MEDICAL EXAMINATION AT A HEALTH CARE FACILITY: Primary | ICD-10-CM

## 2018-12-19 DIAGNOSIS — Z12.12 SCREENING FOR MALIGNANT NEOPLASM OF THE RECTUM: ICD-10-CM

## 2018-12-19 DIAGNOSIS — Z12.5 SCREENING PSA (PROSTATE SPECIFIC ANTIGEN): ICD-10-CM

## 2018-12-31 RX ORDER — MELOXICAM 7.5 MG/1
TABLET ORAL
Qty: 30 TABLET | Refills: 0 | OUTPATIENT
Start: 2018-12-31

## 2019-01-01 ENCOUNTER — EXTERNAL RECORD (OUTPATIENT)
Dept: HEALTH INFORMATION MANAGEMENT | Facility: OTHER | Age: 69
End: 2019-01-01

## 2019-01-05 ENCOUNTER — LAB SERVICES (OUTPATIENT)
Dept: LAB | Age: 69
End: 2019-01-05

## 2019-01-05 DIAGNOSIS — Z00.00 ROUTINE GENERAL MEDICAL EXAMINATION AT A HEALTH CARE FACILITY: ICD-10-CM

## 2019-01-05 DIAGNOSIS — Z12.12 SCREENING FOR MALIGNANT NEOPLASM OF THE RECTUM: ICD-10-CM

## 2019-01-05 DIAGNOSIS — Z12.5 SCREENING PSA (PROSTATE SPECIFIC ANTIGEN): ICD-10-CM

## 2019-01-05 LAB
ALBUMIN SERPL BCG-MCNC: 4.2 G/DL (ref 3.6–5.1)
ALP SERPL-CCNC: 86 U/L (ref 45–115)
ALT SERPL W/O P-5'-P-CCNC: 19 U/L (ref 10–35)
AST SERPL-CCNC: 20 U/L (ref 9–37)
BILIRUB SERPL-MCNC: 0.6 MG/DL (ref 0–1)
BUN SERPL-MCNC: 12 MG/DL (ref 6–27)
CALCIUM SERPL-MCNC: 9.3 MG/DL (ref 8.6–10.6)
CHLORIDE SERPL-SCNC: 100 MMOL/L (ref 96–107)
CHOLEST SERPL-MCNC: 177 MG/DL (ref 140–200)
CREAT SERPL-MCNC: 1.3 MG/DL (ref 0.6–1.6)
DIFFERENTIAL TYPE: ABNORMAL
GFR SERPL CREATININE-BSD FRML MDRD: 56 ML/MIN/{1.73M2}
GFR SERPL CREATININE-BSD FRML MDRD: >60 ML/MIN/{1.73M2}
GLUCOSE P FAST SERPL-MCNC: 110 MG/DL (ref 60–100)
HCO3 SERPL-SCNC: 25 MMOL/L (ref 22–32)
HDLC SERPL-MCNC: 40 MG/DL
HEMATOCRIT: 46.9 % (ref 40–51)
HEMOGLOBIN: 16.2 G/DL (ref 13.7–17.5)
LDLC SERPL CALC-MCNC: 114 MG/DL (ref 0–100)
LYMPH PERCENT: 23.4 % (ref 20.5–51.1)
LYMPHOCYTE ABSOLUTE #: 1.2 10*3/UL (ref 1.2–3.4)
MEAN CORPUSCULAR HGB CONCENTRATION: 34.5 % (ref 32–36)
MEAN CORPUSCULAR HGB: 31.1 PG (ref 27–34)
MEAN CORPUSCULAR VOLUME: 90 FL (ref 79–95)
MEAN PLATELET VOLUME: 10.3 FL (ref 8.6–12.4)
MIXED %: 13.7 % (ref 4.3–12.9)
MIXED ABSOLUTE #: 0.7 10*3/UL (ref 0.2–0.9)
NEUTROPHIL ABSOLUTE #: 3.4 10*3/UL (ref 1.4–6.5)
NEUTROPHIL PERCENT: 62.9 % (ref 34–73.5)
PLATELET COUNT: 171 10*3/UL (ref 150–400)
POTASSIUM SERPL-SCNC: 4.3 MMOL/L (ref 3.5–5.3)
PROT SERPL-MCNC: 6.9 G/DL (ref 6.2–8.1)
PSA SERPL-MCNC: 1 NG/ML (ref 0–4.9)
RED BLOOD CELL COUNT: 5.21 10*6/UL (ref 3.9–5.7)
RED CELL DISTRIBUTION WIDTH: 13.1 % (ref 11.3–14.8)
SODIUM SERPL-SCNC: 136 MMOL/L (ref 136–146)
TRIGL SERPL-MCNC: 113 MG/DL (ref 0–200)
WHITE BLOOD CELL COUNT: 5.3 10*3/UL (ref 4–10)

## 2019-01-05 PROCEDURE — 80053 COMPREHEN METABOLIC PANEL: CPT | Performed by: INTERNAL MEDICINE

## 2019-01-05 PROCEDURE — G0103 PSA SCREENING: HCPCS | Performed by: INTERNAL MEDICINE

## 2019-01-05 PROCEDURE — 85025 COMPLETE CBC W/AUTO DIFF WBC: CPT | Performed by: INTERNAL MEDICINE

## 2019-01-05 PROCEDURE — 36415 COLL VENOUS BLD VENIPUNCTURE: CPT | Performed by: INTERNAL MEDICINE

## 2019-01-05 PROCEDURE — G0328 FECAL BLOOD SCRN IMMUNOASSAY: HCPCS | Performed by: INTERNAL MEDICINE

## 2019-01-05 PROCEDURE — 80061 LIPID PANEL: CPT | Performed by: INTERNAL MEDICINE

## 2019-01-06 LAB — HEMOCCULT STL QL IA: NEGATIVE

## 2019-01-07 ENCOUNTER — APPOINTMENT (OUTPATIENT)
Dept: INTERNAL MEDICINE | Age: 69
End: 2019-01-07

## 2019-01-07 ENCOUNTER — TELEPHONE (OUTPATIENT)
Dept: SPORTS MEDICINE | Age: 69
End: 2019-01-07

## 2019-01-11 ENCOUNTER — OFFICE VISIT (OUTPATIENT)
Dept: INTERNAL MEDICINE | Age: 69
End: 2019-01-11

## 2019-01-11 VITALS
WEIGHT: 156 LBS | BODY MASS INDEX: 26.63 KG/M2 | SYSTOLIC BLOOD PRESSURE: 140 MMHG | DIASTOLIC BLOOD PRESSURE: 80 MMHG | HEART RATE: 73 BPM | HEIGHT: 64 IN | TEMPERATURE: 96.5 F

## 2019-01-11 DIAGNOSIS — E78.00 PURE HYPERCHOLESTEROLEMIA: ICD-10-CM

## 2019-01-11 DIAGNOSIS — M25.562 CHRONIC PAIN OF LEFT KNEE: ICD-10-CM

## 2019-01-11 DIAGNOSIS — M87.052 AVASCULAR NECROSIS OF FEMORAL HEAD, LEFT (CMD): ICD-10-CM

## 2019-01-11 DIAGNOSIS — R73.01 IFG (IMPAIRED FASTING GLUCOSE): ICD-10-CM

## 2019-01-11 DIAGNOSIS — G89.29 CHRONIC PAIN OF LEFT KNEE: ICD-10-CM

## 2019-01-11 DIAGNOSIS — I10 ESSENTIAL HYPERTENSION: Primary | ICD-10-CM

## 2019-01-11 DIAGNOSIS — M17.0 PRIMARY OSTEOARTHRITIS OF KNEES, BILATERAL: ICD-10-CM

## 2019-01-11 PROBLEM — Z47.89 ORTHOPEDIC AFTERCARE: Status: ACTIVE | Noted: 2017-02-01

## 2019-01-11 PROBLEM — H40.003 OPEN ANGLE WITH BORDERLINE GLAUCOMA FINDINGS, BILATERAL: Status: ACTIVE | Noted: 2018-05-30

## 2019-01-11 PROBLEM — M25.561 CHRONIC PAIN OF RIGHT KNEE: Status: RESOLVED | Noted: 2018-08-30 | Resolved: 2019-01-11

## 2019-01-11 PROBLEM — H47.233 OPTIC CUPPING OF BOTH EYES: Status: ACTIVE | Noted: 2018-05-30

## 2019-01-11 PROBLEM — L81.9 DISCOLORATION OF SKIN OF LOWER LEG: Status: RESOLVED | Noted: 2018-08-08 | Resolved: 2019-01-11

## 2019-01-11 PROBLEM — M19.019 SHOULDER ARTHRITIS: Status: ACTIVE | Noted: 2018-01-24

## 2019-01-11 PROBLEM — M62.81 MUSCLE WEAKNESS: Status: RESOLVED | Noted: 2018-10-17 | Resolved: 2019-01-11

## 2019-01-11 PROBLEM — Z96.1 H/O ARTIFICIAL LENS REPLACEMENT: Status: ACTIVE | Noted: 2018-05-30

## 2019-01-11 PROBLEM — H52.4 PRESBYOPIA: Status: ACTIVE | Noted: 2018-05-30

## 2019-01-11 PROBLEM — M75.121 COMPLETE TEAR OF RIGHT ROTATOR CUFF: Status: RESOLVED | Noted: 2017-01-05 | Resolved: 2019-01-11

## 2019-01-11 PROBLEM — M75.121 COMPLETE TEAR OF RIGHT ROTATOR CUFF: Status: ACTIVE | Noted: 2017-01-05

## 2019-01-11 PROBLEM — R26.2 DIFFICULTY WALKING: Status: ACTIVE | Noted: 2018-10-17

## 2019-01-11 PROBLEM — E78.2 MIXED HYPERLIPIDEMIA: Status: ACTIVE | Noted: 2018-01-24

## 2019-01-11 PROBLEM — M16.0 PRIMARY OSTEOARTHRITIS OF HIPS, BILATERAL: Status: ACTIVE | Noted: 2019-01-11

## 2019-01-11 PROBLEM — M16.0 PRIMARY OSTEOARTHRITIS OF HIPS, BILATERAL: Status: RESOLVED | Noted: 2019-01-11 | Resolved: 2019-01-11

## 2019-01-11 PROBLEM — Z98.890 S/P ROTATOR CUFF REPAIR: Status: ACTIVE | Noted: 2017-03-15

## 2019-01-11 PROBLEM — M25.561 CHRONIC PAIN OF RIGHT KNEE: Status: ACTIVE | Noted: 2018-08-30

## 2019-01-11 PROBLEM — H52.11 MYOPIA, RIGHT EYE: Status: ACTIVE | Noted: 2018-05-30

## 2019-01-11 PROBLEM — M22.2X9 DISORDER OF PATELLOFEMORAL JOINT: Status: ACTIVE | Noted: 2018-08-31

## 2019-01-11 PROBLEM — L81.9 DISCOLORATION OF SKIN OF LOWER LEG: Status: ACTIVE | Noted: 2018-08-08

## 2019-01-11 PROBLEM — M22.2X9 DISORDER OF PATELLOFEMORAL JOINT: Status: RESOLVED | Noted: 2018-08-31 | Resolved: 2019-01-11

## 2019-01-11 PROBLEM — M62.81 MUSCLE WEAKNESS: Status: ACTIVE | Noted: 2018-10-17

## 2019-01-11 PROBLEM — M17.12 OSTEOARTHRITIS OF LEFT KNEE: Status: ACTIVE | Noted: 2018-08-31

## 2019-01-11 PROCEDURE — 3079F DIAST BP 80-89 MM HG: CPT | Performed by: INTERNAL MEDICINE

## 2019-01-11 PROCEDURE — 3077F SYST BP >= 140 MM HG: CPT | Performed by: INTERNAL MEDICINE

## 2019-01-11 PROCEDURE — 99214 OFFICE O/P EST MOD 30 MIN: CPT | Performed by: INTERNAL MEDICINE

## 2019-01-30 ENCOUNTER — APPOINTMENT (OUTPATIENT)
Dept: INTERNAL MEDICINE | Age: 69
End: 2019-01-30

## 2019-03-05 VITALS
HEART RATE: 60 BPM | WEIGHT: 152 LBS | BODY MASS INDEX: 25.33 KG/M2 | SYSTOLIC BLOOD PRESSURE: 156 MMHG | BODY MASS INDEX: 25.99 KG/M2 | DIASTOLIC BLOOD PRESSURE: 86 MMHG | WEIGHT: 156 LBS | HEIGHT: 65 IN | HEIGHT: 65 IN

## 2019-03-06 VITALS — BODY MASS INDEX: 25.61 KG/M2 | HEIGHT: 64 IN | WEIGHT: 150 LBS

## 2019-03-06 VITALS
WEIGHT: 150 LBS | TEMPERATURE: 98.6 F | BODY MASS INDEX: 25.61 KG/M2 | SYSTOLIC BLOOD PRESSURE: 124 MMHG | DIASTOLIC BLOOD PRESSURE: 86 MMHG | HEIGHT: 64 IN

## 2019-03-21 ENCOUNTER — IMAGING SERVICES (OUTPATIENT)
Dept: MRI IMAGING | Age: 69
End: 2019-03-21
Attending: INTERNAL MEDICINE

## 2019-03-21 DIAGNOSIS — M87.052 AVASCULAR NECROSIS OF FEMORAL HEAD, LEFT (CMD): ICD-10-CM

## 2019-03-21 PROCEDURE — 73721 MRI JNT OF LWR EXTRE W/O DYE: CPT | Performed by: RADIOLOGY

## 2019-04-01 DIAGNOSIS — M25.559 ARTHRALGIA OF HIP, UNSPECIFIED LATERALITY: Primary | ICD-10-CM

## 2019-04-29 ENCOUNTER — IMAGING SERVICES (OUTPATIENT)
Dept: GENERAL RADIOLOGY | Age: 69
End: 2019-04-29
Attending: FAMILY MEDICINE

## 2019-04-29 ENCOUNTER — LAB SERVICES (OUTPATIENT)
Dept: LAB | Age: 69
End: 2019-04-29

## 2019-04-29 ENCOUNTER — OFFICE VISIT (OUTPATIENT)
Dept: SPORTS MEDICINE | Age: 69
End: 2019-04-29
Attending: INTERNAL MEDICINE

## 2019-04-29 VITALS
WEIGHT: 158 LBS | HEIGHT: 64 IN | HEART RATE: 80 BPM | SYSTOLIC BLOOD PRESSURE: 130 MMHG | DIASTOLIC BLOOD PRESSURE: 78 MMHG | BODY MASS INDEX: 26.98 KG/M2

## 2019-04-29 DIAGNOSIS — E78.00 PURE HYPERCHOLESTEROLEMIA: ICD-10-CM

## 2019-04-29 DIAGNOSIS — R73.01 IFG (IMPAIRED FASTING GLUCOSE): ICD-10-CM

## 2019-04-29 DIAGNOSIS — M70.62 TROCHANTERIC BURSITIS OF LEFT HIP: ICD-10-CM

## 2019-04-29 DIAGNOSIS — I10 ESSENTIAL HYPERTENSION: ICD-10-CM

## 2019-04-29 DIAGNOSIS — M16.12 ARTHRITIS OF LEFT HIP: ICD-10-CM

## 2019-04-29 DIAGNOSIS — G89.29 CHRONIC LEFT HIP PAIN: Primary | ICD-10-CM

## 2019-04-29 DIAGNOSIS — E78.2 MIXED HYPERLIPIDEMIA: ICD-10-CM

## 2019-04-29 DIAGNOSIS — M25.562 PAIN IN LEFT KNEE: ICD-10-CM

## 2019-04-29 DIAGNOSIS — M25.552 CHRONIC LEFT HIP PAIN: Primary | ICD-10-CM

## 2019-04-29 DIAGNOSIS — M25.862 OTHER SPECIFIED JOINT DISORDERS, LEFT KNEE: ICD-10-CM

## 2019-04-29 DIAGNOSIS — M87.052 AVASCULAR NECROSIS OF FEMORAL HEAD, LEFT (CMD): ICD-10-CM

## 2019-04-29 DIAGNOSIS — M17.12 PRIMARY OSTEOARTHRITIS OF LEFT KNEE: ICD-10-CM

## 2019-04-29 DIAGNOSIS — I10 ESSENTIAL (PRIMARY) HYPERTENSION: ICD-10-CM

## 2019-04-29 LAB
ALBUMIN SERPL BCG-MCNC: 4.1 G/DL (ref 3.6–5.1)
ALP SERPL-CCNC: 73 U/L (ref 45–115)
ALT SERPL W/O P-5'-P-CCNC: 18 U/L (ref 10–35)
AST SERPL-CCNC: 19 U/L (ref 9–37)
BILIRUB SERPL-MCNC: 0.5 MG/DL (ref 0–1)
BUN SERPL-MCNC: 10 MG/DL (ref 6–27)
CALCIUM SERPL-MCNC: 8.8 MG/DL (ref 8.6–10.6)
CHLORIDE SERPL-SCNC: 103 MMOL/L (ref 96–107)
CHOLEST SERPL-MCNC: 160 MG/DL (ref 140–200)
CREAT SERPL-MCNC: 1.1 MG/DL (ref 0.6–1.6)
EST. AVERAGE GLUCOSE BLD GHB EST-MCNC: 123 MG/DL (ref 0–154)
GFR SERPL CREATININE-BSD FRML MDRD: >60 ML/MIN/{1.73M2}
GFR SERPL CREATININE-BSD FRML MDRD: >60 ML/MIN/{1.73M2}
GLUCOSE P FAST SERPL-MCNC: 104 MG/DL (ref 60–100)
HBA1C BLD-MCNC: 5.9 % (ref 4.2–6)
HCO3 SERPL-SCNC: 26 MMOL/L (ref 22–32)
HDLC SERPL-MCNC: 41 MG/DL
LDLC SERPL CALC-MCNC: 97 MG/DL (ref 0–100)
POTASSIUM SERPL-SCNC: 4.3 MMOL/L (ref 3.5–5.3)
PROT SERPL-MCNC: 6.6 G/DL (ref 6.2–8.1)
SODIUM SERPL-SCNC: 140 MMOL/L (ref 136–146)
TRIGL SERPL-MCNC: 114 MG/DL (ref 0–200)

## 2019-04-29 PROCEDURE — 3075F SYST BP GE 130 - 139MM HG: CPT | Performed by: FAMILY MEDICINE

## 2019-04-29 PROCEDURE — 36415 COLL VENOUS BLD VENIPUNCTURE: CPT | Performed by: INTERNAL MEDICINE

## 2019-04-29 PROCEDURE — 80053 COMPREHEN METABOLIC PANEL: CPT | Performed by: INTERNAL MEDICINE

## 2019-04-29 PROCEDURE — 80061 LIPID PANEL: CPT | Performed by: INTERNAL MEDICINE

## 2019-04-29 PROCEDURE — 3078F DIAST BP <80 MM HG: CPT | Performed by: FAMILY MEDICINE

## 2019-04-29 PROCEDURE — 99215 OFFICE O/P EST HI 40 MIN: CPT | Performed by: FAMILY MEDICINE

## 2019-04-29 PROCEDURE — 83036 HEMOGLOBIN GLYCOSYLATED A1C: CPT | Performed by: INTERNAL MEDICINE

## 2019-04-29 SDOH — HEALTH STABILITY: MENTAL HEALTH: HOW OFTEN DO YOU HAVE A DRINK CONTAINING ALCOHOL?: NEVER

## 2019-05-06 DIAGNOSIS — M17.12 PRIMARY OSTEOARTHRITIS OF LEFT KNEE: Primary | ICD-10-CM

## 2019-05-31 PROCEDURE — 20611 DRAIN/INJ JOINT/BURSA W/US: CPT | Performed by: FAMILY MEDICINE

## 2019-05-31 RX ADMIN — LIDOCAINE HYDROCHLORIDE 2 ML: 10 INJECTION, SOLUTION INFILTRATION; PERINEURAL at 15:46

## 2019-05-31 RX ADMIN — TRIAMCINOLONE ACETONIDE 40 MG: 40 INJECTION, SUSPENSION INTRA-ARTICULAR; INTRAMUSCULAR at 15:46

## 2019-05-31 RX ADMIN — BUPIVACAINE HYDROCHLORIDE 2 ML: 5 INJECTION, SOLUTION PERINEURAL at 15:46

## 2019-06-03 ENCOUNTER — OFFICE VISIT (OUTPATIENT)
Dept: SPORTS MEDICINE | Age: 69
End: 2019-06-03

## 2019-06-03 VITALS
DIASTOLIC BLOOD PRESSURE: 90 MMHG | HEIGHT: 64 IN | HEART RATE: 80 BPM | WEIGHT: 157 LBS | SYSTOLIC BLOOD PRESSURE: 140 MMHG | RESPIRATION RATE: 18 BRPM | BODY MASS INDEX: 26.8 KG/M2

## 2019-06-03 DIAGNOSIS — M70.62 TROCHANTERIC BURSITIS OF LEFT HIP: Primary | ICD-10-CM

## 2019-06-03 DIAGNOSIS — M25.552 CHRONIC LEFT HIP PAIN: ICD-10-CM

## 2019-06-03 DIAGNOSIS — G89.29 CHRONIC LEFT HIP PAIN: ICD-10-CM

## 2019-06-03 PROCEDURE — 20611 DRAIN/INJ JOINT/BURSA W/US: CPT | Performed by: FAMILY MEDICINE

## 2019-06-03 RX ORDER — LIDOCAINE HYDROCHLORIDE 10 MG/ML
2 INJECTION, SOLUTION INFILTRATION; PERINEURAL
Status: COMPLETED | OUTPATIENT
Start: 2019-05-31 | End: 2019-05-31

## 2019-06-03 RX ORDER — BUPIVACAINE HYDROCHLORIDE 5 MG/ML
2 INJECTION, SOLUTION PERINEURAL
Status: COMPLETED | OUTPATIENT
Start: 2019-05-31 | End: 2019-05-31

## 2019-06-03 RX ORDER — TRIAMCINOLONE ACETONIDE 40 MG/ML
40 INJECTION, SUSPENSION INTRA-ARTICULAR; INTRAMUSCULAR
Status: COMPLETED | OUTPATIENT
Start: 2019-05-31 | End: 2019-05-31

## 2019-06-03 RX ORDER — LIDOCAINE HYDROCHLORIDE 10 MG/ML
2 INJECTION, SOLUTION INFILTRATION; PERINEURAL
Status: COMPLETED | OUTPATIENT
Start: 2019-06-03 | End: 2019-06-03

## 2019-06-03 RX ORDER — TRIAMCINOLONE ACETONIDE 40 MG/ML
40 INJECTION, SUSPENSION INTRA-ARTICULAR; INTRAMUSCULAR
Status: COMPLETED | OUTPATIENT
Start: 2019-06-03 | End: 2019-06-03

## 2019-06-03 RX ORDER — BUPIVACAINE HYDROCHLORIDE 5 MG/ML
2 INJECTION, SOLUTION PERINEURAL
Status: COMPLETED | OUTPATIENT
Start: 2019-06-03 | End: 2019-06-03

## 2019-06-03 RX ADMIN — LIDOCAINE HYDROCHLORIDE 2 ML: 10 INJECTION, SOLUTION INFILTRATION; PERINEURAL at 15:38

## 2019-06-03 RX ADMIN — BUPIVACAINE HYDROCHLORIDE 2 ML: 5 INJECTION, SOLUTION PERINEURAL at 15:38

## 2019-06-03 RX ADMIN — TRIAMCINOLONE ACETONIDE 40 MG: 40 INJECTION, SUSPENSION INTRA-ARTICULAR; INTRAMUSCULAR at 15:38

## 2019-06-03 SDOH — HEALTH STABILITY: MENTAL HEALTH: HOW OFTEN DO YOU HAVE A DRINK CONTAINING ALCOHOL?: NEVER

## 2019-06-10 ENCOUNTER — TELEPHONE (OUTPATIENT)
Dept: SCHEDULING | Age: 69
End: 2019-06-10

## 2019-06-26 ENCOUNTER — OFFICE VISIT (OUTPATIENT)
Dept: INTERNAL MEDICINE | Age: 69
End: 2019-06-26

## 2019-06-26 VITALS
HEIGHT: 64 IN | HEART RATE: 60 BPM | DIASTOLIC BLOOD PRESSURE: 70 MMHG | TEMPERATURE: 96.4 F | WEIGHT: 150 LBS | BODY MASS INDEX: 25.61 KG/M2 | RESPIRATION RATE: 16 BRPM | SYSTOLIC BLOOD PRESSURE: 120 MMHG

## 2019-06-26 DIAGNOSIS — M87.052 AVASCULAR NECROSIS OF FEMORAL HEAD, LEFT (CMD): ICD-10-CM

## 2019-06-26 DIAGNOSIS — H47.233 OPTIC CUPPING OF BOTH EYES: ICD-10-CM

## 2019-06-26 DIAGNOSIS — M70.62 TROCHANTERIC BURSITIS OF LEFT HIP: ICD-10-CM

## 2019-06-26 DIAGNOSIS — R73.01 IFG (IMPAIRED FASTING GLUCOSE): ICD-10-CM

## 2019-06-26 DIAGNOSIS — Z11.59 NEED FOR HEPATITIS C SCREENING TEST: ICD-10-CM

## 2019-06-26 DIAGNOSIS — M17.12 PRIMARY OSTEOARTHRITIS OF LEFT KNEE: ICD-10-CM

## 2019-06-26 DIAGNOSIS — Z98.890 S/P ROTATOR CUFF REPAIR: ICD-10-CM

## 2019-06-26 DIAGNOSIS — M19.019 SHOULDER ARTHRITIS: ICD-10-CM

## 2019-06-26 DIAGNOSIS — R07.9 CHEST PAIN AT REST: Primary | ICD-10-CM

## 2019-06-26 DIAGNOSIS — Z23 NEED FOR VACCINATION: ICD-10-CM

## 2019-06-26 DIAGNOSIS — E78.2 MIXED HYPERLIPIDEMIA: ICD-10-CM

## 2019-06-26 DIAGNOSIS — M83.9 ADULT OSTEOMALACIA: ICD-10-CM

## 2019-06-26 DIAGNOSIS — H40.003 OPEN ANGLE WITH BORDERLINE GLAUCOMA FINDINGS, BILATERAL: ICD-10-CM

## 2019-06-26 PROBLEM — M25.562 LEFT KNEE PAIN: Status: RESOLVED | Noted: 2018-08-31 | Resolved: 2019-06-26

## 2019-06-26 PROBLEM — E78.00 PURE HYPERCHOLESTEROLEMIA: Status: RESOLVED | Noted: 2019-01-11 | Resolved: 2019-06-26

## 2019-06-26 PROCEDURE — G0439 PPPS, SUBSEQ VISIT: HCPCS | Performed by: INTERNAL MEDICINE

## 2019-06-26 PROCEDURE — 93000 ELECTROCARDIOGRAM COMPLETE: CPT | Performed by: INTERNAL MEDICINE

## 2019-06-26 PROCEDURE — 99214 OFFICE O/P EST MOD 30 MIN: CPT | Performed by: INTERNAL MEDICINE

## 2019-06-26 PROCEDURE — 90670 PCV13 VACCINE IM: CPT

## 2019-06-26 PROCEDURE — G0009 ADMIN PNEUMOCOCCAL VACCINE: HCPCS

## 2019-06-26 ASSESSMENT — COGNITIVE AND FUNCTIONAL STATUS - GENERAL
BECAUSE OF A PHYSICAL, MENTAL, OR EMOTIONAL CONDITION, DO YOU HAVE DIFFICULTY DOING ERRANDS ALONE: NO
ARE YOU BLIND OR DO YOU HAVE SERIOUS DIFFICULTY SEEING, EVEN WHEN WEARING GLASSES: NO
DO YOU HAVE DIFFICULTY DRESSING OR BATHING: NO
BECAUSE OF A PHYSICAL, MENTAL, OR EMOTIONAL CONDITION, DO YOU HAVE SERIOUS DIFFICULTY CONCENTRATING, REMEMBERING OR MAKING DECISIONS: NO
ARE YOU DEAF OR DO YOU HAVE SERIOUS DIFFICULTY  HEARING: NO
DO YOU HAVE SERIOUS DIFFICULTY WALKING OR CLIMBING STAIRS: NO

## 2019-06-26 ASSESSMENT — ACTIVITIES OF DAILY LIVING (ADL)
ADL_BEFORE_ADMISSION: INDEPENDENT
NEEDS_ASSIST: NO
ADL_SHORT_OF_BREATH: NO
ADL_SCORE: 12
RECENT_DECLINE_ADL: NO

## 2019-06-26 ASSESSMENT — PATIENT HEALTH QUESTIONNAIRE - PHQ9
2. FEELING DOWN, DEPRESSED OR HOPELESS: NOT AT ALL
SUM OF ALL RESPONSES TO PHQ9 QUESTIONS 1 AND 2: 0
SUM OF ALL RESPONSES TO PHQ9 QUESTIONS 1 AND 2: 0
1. LITTLE INTEREST OR PLEASURE IN DOING THINGS: NOT AT ALL

## 2019-08-12 ENCOUNTER — OFFICE VISIT (OUTPATIENT)
Dept: OPTOMETRY | Age: 69
End: 2019-08-12
Attending: INTERNAL MEDICINE

## 2019-08-12 ENCOUNTER — OFFICE VISIT (OUTPATIENT)
Dept: OPHTHALMOLOGY | Age: 69
End: 2019-08-12

## 2019-08-12 DIAGNOSIS — H52.4 PRESBYOPIA: Primary | ICD-10-CM

## 2019-08-12 DIAGNOSIS — H40.003 OPEN ANGLE WITH BORDERLINE GLAUCOMA FINDINGS, BILATERAL: Primary | ICD-10-CM

## 2019-08-12 PROCEDURE — 92133 CPTRZD OPH DX IMG PST SGM ON: CPT | Performed by: OPTOMETRIST

## 2019-08-12 PROCEDURE — 92015 DETERMINE REFRACTIVE STATE: CPT | Performed by: OPTOMETRIST

## 2019-08-12 PROCEDURE — 92014 COMPRE OPH EXAM EST PT 1/>: CPT | Performed by: OPTOMETRIST

## 2019-08-12 ASSESSMENT — VISUAL ACUITY
METHOD: SNELLEN - LINEAR
OD_CC+: +3
OS_CC: J2@16
CORRECTION_TYPE: GLASSES
OS_CC: 20/25
OD_CC: 20/30
OD_CC: J2@16

## 2019-08-12 ASSESSMENT — KERATOMETRY
OS_K2POWER_DIOPTERS: 42.00
OD_K1POWER_DIOPTERS: 42.00
OD_K2POWER_DIOPTERS: 41.50
OD_AXISANGLE2_DEGREES: 180
OS_AXISANGLE_DEGREES: 090
OS_AXISANGLE2_DEGREES: 180
OS_K1POWER_DIOPTERS: 41.37
OD_AXISANGLE_DEGREES: 090

## 2019-08-12 ASSESSMENT — TONOMETRY
IOP_METHOD: APPLANATION
OS_IOP_MMHG: 16
OD_IOP_MMHG: 16

## 2019-08-12 ASSESSMENT — CONF VISUAL FIELD
OS_NORMAL: 1
OD_NORMAL: 1

## 2019-08-12 ASSESSMENT — REFRACTION_WEARINGRX
OS_SPHERE: +0.00
OD_ADD: +2.50
OD_SPHERE: -0.50
OD_CYLINDER: +0.25
OD_AXIS: 175
OS_AXIS: 105
OS_CYLINDER: +0.50
OS_ADD: +2.50

## 2019-08-12 ASSESSMENT — REFRACTION_MANIFEST
OS_SPHERE: PLANO
OD_SPHERE: PLANO
OS_ADD: +2.50
OD_ADD: +2.50

## 2019-08-12 ASSESSMENT — EXTERNAL EXAM - RIGHT EYE: OD_EXAM: NORMAL

## 2019-08-12 ASSESSMENT — CUP TO DISC RATIO
OD_RATIO: 0.8
OS_RATIO: 0.7

## 2019-08-12 ASSESSMENT — SLIT LAMP EXAM - LIDS
COMMENTS: NORMAL
COMMENTS: NORMAL

## 2019-08-12 ASSESSMENT — EXTERNAL EXAM - LEFT EYE: OS_EXAM: NORMAL

## 2019-08-13 ENCOUNTER — APPOINTMENT (OUTPATIENT)
Dept: OPHTHALMOLOGY | Age: 69
End: 2019-08-13

## 2019-09-28 ENCOUNTER — IMMUNIZATION (OUTPATIENT)
Dept: URGENT CARE | Age: 69
End: 2019-09-28

## 2019-09-28 DIAGNOSIS — Z23 NEED FOR INFLUENZA VACCINATION: Primary | ICD-10-CM

## 2019-09-28 PROCEDURE — 90686 IIV4 VACC NO PRSV 0.5 ML IM: CPT | Performed by: FAMILY MEDICINE

## 2019-09-28 PROCEDURE — 90471 IMMUNIZATION ADMIN: CPT | Performed by: FAMILY MEDICINE

## 2019-12-27 ENCOUNTER — OFFICE VISIT (OUTPATIENT)
Dept: INTERNAL MEDICINE | Age: 69
End: 2019-12-27

## 2019-12-27 VITALS
OXYGEN SATURATION: 98 % | HEIGHT: 64 IN | HEART RATE: 85 BPM | BODY MASS INDEX: 26.63 KG/M2 | WEIGHT: 156 LBS | TEMPERATURE: 98.3 F | DIASTOLIC BLOOD PRESSURE: 76 MMHG | SYSTOLIC BLOOD PRESSURE: 136 MMHG

## 2019-12-27 DIAGNOSIS — R73.01 IFG (IMPAIRED FASTING GLUCOSE): ICD-10-CM

## 2019-12-27 DIAGNOSIS — M70.62 TROCHANTERIC BURSITIS OF LEFT HIP: ICD-10-CM

## 2019-12-27 DIAGNOSIS — M87.052 AVASCULAR NECROSIS OF FEMORAL HEAD, LEFT (CMD): ICD-10-CM

## 2019-12-27 DIAGNOSIS — E78.2 MIXED HYPERLIPIDEMIA: Primary | ICD-10-CM

## 2019-12-27 DIAGNOSIS — M19.019 SHOULDER ARTHRITIS: ICD-10-CM

## 2019-12-27 PROCEDURE — 99214 OFFICE O/P EST MOD 30 MIN: CPT | Performed by: INTERNAL MEDICINE

## 2019-12-28 ENCOUNTER — LAB SERVICES (OUTPATIENT)
Dept: LAB | Age: 69
End: 2019-12-28

## 2019-12-28 DIAGNOSIS — R73.01 IFG (IMPAIRED FASTING GLUCOSE): ICD-10-CM

## 2019-12-28 DIAGNOSIS — E78.2 MIXED HYPERLIPIDEMIA: ICD-10-CM

## 2019-12-28 DIAGNOSIS — M19.019 SHOULDER ARTHRITIS: ICD-10-CM

## 2019-12-28 LAB
ALBUMIN SERPL BCG-MCNC: 4.3 G/DL (ref 3.6–5.1)
ALP SERPL-CCNC: 80 U/L (ref 45–115)
ALT SERPL W/O P-5'-P-CCNC: 18 U/L (ref 10–35)
AST SERPL-CCNC: 19 U/L (ref 9–37)
BILIRUB SERPL-MCNC: 0.7 MG/DL (ref 0–1)
BUN SERPL-MCNC: 11 MG/DL (ref 6–27)
CALCIUM SERPL-MCNC: 9.1 MG/DL (ref 8.6–10.6)
CHLORIDE SERPL-SCNC: 102 MMOL/L (ref 96–107)
CHOLEST SERPL-MCNC: 171 MG/DL (ref 140–200)
CREAT SERPL-MCNC: 1.2 MG/DL (ref 0.6–1.6)
DIFFERENTIAL TYPE: NORMAL
EST. AVERAGE GLUCOSE BLD GHB EST-MCNC: 134 MG/DL (ref 0–154)
GFR SERPL CREATININE-BSD FRML MDRD: 60 ML/MIN/{1.73M2}
GFR SERPL CREATININE-BSD FRML MDRD: >60 ML/MIN/{1.73M2}
GLUCOSE P FAST SERPL-MCNC: 114 MG/DL (ref 60–100)
HBA1C MFR BLD: 6.3 % (ref 4.2–6)
HCO3 SERPL-SCNC: 28 MMOL/L (ref 22–32)
HDLC SERPL-MCNC: 40 MG/DL
HEMATOCRIT: 45.8 % (ref 40–51)
HEMOGLOBIN: 15.6 G/DL (ref 13.7–17.5)
HEPATITIS C ANTIBODY: NEGATIVE
LDLC SERPL CALC-MCNC: 111 MG/DL (ref 0–100)
LYMPH PERCENT: 27.7 % (ref 20.5–51.1)
LYMPHOCYTE ABSOLUTE #: 1.6 10*3/UL (ref 1.2–3.4)
MEAN CORPUSCULAR HGB CONCENTRATION: 34.1 % (ref 32–36)
MEAN CORPUSCULAR HGB: 30.6 PG (ref 27–34)
MEAN CORPUSCULAR VOLUME: 89.8 FL (ref 79–95)
MEAN PLATELET VOLUME: 10 FL (ref 8.6–12.4)
MIXED %: 8.3 % (ref 4.3–12.9)
MIXED ABSOLUTE #: 0.5 10*3/UL (ref 0.2–0.9)
NEUTROPHIL ABSOLUTE #: 3.5 10*3/UL (ref 1.4–6.5)
NEUTROPHIL PERCENT: 64 % (ref 34–73.5)
PLATELET COUNT: 176 10*3/UL (ref 150–400)
POTASSIUM SERPL-SCNC: 4.2 MMOL/L (ref 3.5–5.3)
PROT SERPL-MCNC: 7 G/DL (ref 6.2–8.1)
RED BLOOD CELL COUNT: 5.1 10*6/UL (ref 3.9–5.7)
RED CELL DISTRIBUTION WIDTH: 12.7 % (ref 11.3–14.8)
SODIUM SERPL-SCNC: 137 MMOL/L (ref 136–146)
TRIGL SERPL-MCNC: 99 MG/DL (ref 0–200)
WHITE BLOOD CELL COUNT: 5.6 10*3/UL (ref 4–10)

## 2019-12-28 PROCEDURE — 83036 HEMOGLOBIN GLYCOSYLATED A1C: CPT | Performed by: INTERNAL MEDICINE

## 2019-12-28 PROCEDURE — 86803 HEPATITIS C AB TEST: CPT | Performed by: INTERNAL MEDICINE

## 2019-12-28 PROCEDURE — 85025 COMPLETE CBC W/AUTO DIFF WBC: CPT | Performed by: INTERNAL MEDICINE

## 2019-12-28 PROCEDURE — 80061 LIPID PANEL: CPT | Performed by: INTERNAL MEDICINE

## 2019-12-28 PROCEDURE — 80053 COMPREHEN METABOLIC PANEL: CPT | Performed by: INTERNAL MEDICINE

## 2019-12-28 PROCEDURE — 36415 COLL VENOUS BLD VENIPUNCTURE: CPT | Performed by: INTERNAL MEDICINE

## 2019-12-30 DIAGNOSIS — E78.5 SERUM LIPIDS HIGH: Primary | ICD-10-CM

## 2019-12-30 DIAGNOSIS — R73.9 HIGH BLOOD SUGAR: ICD-10-CM

## 2020-07-13 ENCOUNTER — E-ADVICE (OUTPATIENT)
Dept: INTERNAL MEDICINE | Age: 70
End: 2020-07-13

## 2020-07-18 ENCOUNTER — LAB SERVICES (OUTPATIENT)
Dept: LAB | Age: 70
End: 2020-07-18

## 2020-07-18 DIAGNOSIS — E78.5 SERUM LIPIDS HIGH: ICD-10-CM

## 2020-07-18 DIAGNOSIS — R73.9 HIGH BLOOD SUGAR: ICD-10-CM

## 2020-07-18 LAB
ALBUMIN SERPL BCG-MCNC: 4.3 G/DL (ref 3.6–5.1)
ALP SERPL-CCNC: 84 U/L (ref 45–115)
ALT SERPL W/O P-5'-P-CCNC: 16 U/L (ref 10–35)
AST SERPL-CCNC: 16 U/L (ref 9–37)
BILIRUB SERPL-MCNC: 0.9 MG/DL (ref 0–1)
BUN SERPL-MCNC: 12 MG/DL (ref 6–27)
CALCIUM SERPL-MCNC: 8.8 MG/DL (ref 8.6–10.6)
CHLORIDE SERPL-SCNC: 101 MMOL/L (ref 96–107)
CHOLEST SERPL-MCNC: 160 MG/DL (ref 140–200)
CREAT SERPL-MCNC: 1.2 MG/DL (ref 0.6–1.6)
EST. AVERAGE GLUCOSE BLD GHB EST-MCNC: 124 MG/DL (ref 0–154)
GFR SERPL CREATININE-BSD FRML MDRD: >60 ML/MIN/{1.73M2}
GFR SERPL CREATININE-BSD FRML MDRD: >60 ML/MIN/{1.73M2}
GLUCOSE P FAST SERPL-MCNC: 104 MG/DL (ref 60–100)
HBA1C MFR BLD: 6 % (ref 4.2–6)
HCO3 SERPL-SCNC: 26 MMOL/L (ref 22–32)
HDLC SERPL-MCNC: 39 MG/DL
LDLC SERPL CALC-MCNC: 98 MG/DL (ref 0–100)
POTASSIUM SERPL-SCNC: 4.5 MMOL/L (ref 3.5–5.3)
PROT SERPL-MCNC: 6.8 G/DL (ref 6.2–8.1)
SODIUM SERPL-SCNC: 137 MMOL/L (ref 136–146)
TRIGL SERPL-MCNC: 112 MG/DL (ref 0–200)

## 2020-07-18 PROCEDURE — 83036 HEMOGLOBIN GLYCOSYLATED A1C: CPT | Performed by: INTERNAL MEDICINE

## 2020-07-18 PROCEDURE — 80061 LIPID PANEL: CPT | Performed by: INTERNAL MEDICINE

## 2020-07-18 PROCEDURE — 80053 COMPREHEN METABOLIC PANEL: CPT | Performed by: INTERNAL MEDICINE

## 2020-07-18 PROCEDURE — 36415 COLL VENOUS BLD VENIPUNCTURE: CPT | Performed by: INTERNAL MEDICINE

## 2020-07-22 ENCOUNTER — OFFICE VISIT (OUTPATIENT)
Dept: INTERNAL MEDICINE | Age: 70
End: 2020-07-22

## 2020-07-22 VITALS
HEIGHT: 64 IN | HEART RATE: 64 BPM | TEMPERATURE: 98 F | DIASTOLIC BLOOD PRESSURE: 70 MMHG | WEIGHT: 150 LBS | BODY MASS INDEX: 25.61 KG/M2 | RESPIRATION RATE: 18 BRPM | SYSTOLIC BLOOD PRESSURE: 132 MMHG

## 2020-07-22 DIAGNOSIS — H52.4 PRESBYOPIA: ICD-10-CM

## 2020-07-22 DIAGNOSIS — K42.9 UMBILICAL HERNIA WITHOUT OBSTRUCTION AND WITHOUT GANGRENE: ICD-10-CM

## 2020-07-22 DIAGNOSIS — R73.01 IFG (IMPAIRED FASTING GLUCOSE): Primary | ICD-10-CM

## 2020-07-22 DIAGNOSIS — H40.003 OPEN ANGLE WITH BORDERLINE GLAUCOMA FINDINGS, BILATERAL: ICD-10-CM

## 2020-07-22 DIAGNOSIS — M19.019 SHOULDER ARTHRITIS: ICD-10-CM

## 2020-07-22 DIAGNOSIS — H47.233 OPTIC CUPPING OF BOTH EYES: ICD-10-CM

## 2020-07-22 DIAGNOSIS — E78.2 MIXED HYPERLIPIDEMIA: ICD-10-CM

## 2020-07-22 DIAGNOSIS — M17.12 PRIMARY OSTEOARTHRITIS OF LEFT KNEE: ICD-10-CM

## 2020-07-22 PROBLEM — R26.2 DIFFICULTY WALKING: Status: RESOLVED | Noted: 2018-10-17 | Resolved: 2020-07-22

## 2020-07-22 PROBLEM — Z47.89 ORTHOPEDIC AFTERCARE: Status: RESOLVED | Noted: 2017-02-01 | Resolved: 2020-07-22

## 2020-07-22 PROCEDURE — G0439 PPPS, SUBSEQ VISIT: HCPCS | Performed by: INTERNAL MEDICINE

## 2020-07-22 PROCEDURE — 99214 OFFICE O/P EST MOD 30 MIN: CPT | Performed by: INTERNAL MEDICINE

## 2020-07-22 SDOH — HEALTH STABILITY: MENTAL HEALTH: HOW OFTEN DO YOU HAVE A DRINK CONTAINING ALCOHOL?: NEVER

## 2020-07-22 ASSESSMENT — PATIENT HEALTH QUESTIONNAIRE - PHQ9
CLINICAL INTERPRETATION OF PHQ2 SCORE: NO FURTHER SCREENING NEEDED
SUM OF ALL RESPONSES TO PHQ9 QUESTIONS 1 AND 2: 0
CLINICAL INTERPRETATION OF PHQ9 SCORE: NO FURTHER SCREENING NEEDED
SUM OF ALL RESPONSES TO PHQ9 QUESTIONS 1 AND 2: 0
1. LITTLE INTEREST OR PLEASURE IN DOING THINGS: NOT AT ALL
2. FEELING DOWN, DEPRESSED OR HOPELESS: NOT AT ALL

## 2020-10-02 ENCOUNTER — OFFICE VISIT (OUTPATIENT)
Dept: OPHTHALMOLOGY | Age: 70
End: 2020-10-02

## 2020-10-02 ENCOUNTER — OFFICE VISIT (OUTPATIENT)
Dept: OPTOMETRY | Age: 70
End: 2020-10-02
Attending: INTERNAL MEDICINE

## 2020-10-02 DIAGNOSIS — H40.003 OPEN ANGLE WITH BORDERLINE GLAUCOMA FINDINGS, BILATERAL: ICD-10-CM

## 2020-10-02 DIAGNOSIS — H40.003 OPEN ANGLE WITH BORDERLINE GLAUCOMA FINDINGS, BILATERAL: Primary | ICD-10-CM

## 2020-10-02 DIAGNOSIS — H52.03 HYPERMETROPIA OF BOTH EYES: Primary | ICD-10-CM

## 2020-10-02 DIAGNOSIS — H47.233 OPTIC CUPPING OF BOTH EYES: ICD-10-CM

## 2020-10-02 DIAGNOSIS — H52.4 PRESBYOPIA: ICD-10-CM

## 2020-10-02 DIAGNOSIS — H52.222 REGULAR ASTIGMATISM OF LEFT EYE: ICD-10-CM

## 2020-10-02 PROCEDURE — 92015 DETERMINE REFRACTIVE STATE: CPT | Performed by: OPTOMETRIST

## 2020-10-02 PROCEDURE — 92133 CPTRZD OPH DX IMG PST SGM ON: CPT | Performed by: OPTOMETRIST

## 2020-10-02 PROCEDURE — 92014 COMPRE OPH EXAM EST PT 1/>: CPT | Performed by: OPTOMETRIST

## 2020-10-02 SDOH — HEALTH STABILITY: MENTAL HEALTH: HOW OFTEN DO YOU HAVE A DRINK CONTAINING ALCOHOL?: NEVER

## 2020-10-02 ASSESSMENT — VISUAL ACUITY
OS_CC: 20/30
METHOD: SNELLEN - LINEAR
OD_SC: J1@16""
CORRECTION_TYPE: GLASSES
OD_CC: 20/25
OD_CC+: +2

## 2020-10-02 ASSESSMENT — KERATOMETRY
OS_K1POWER_DIOPTERS: 42.50
OD_AXISANGLE_DEGREES: 090
OD_K2POWER_DIOPTERS: 41.75
OD_AXISANGLE2_DEGREES: 180
OS_AXISANGLE_DEGREES: 09
OS_K2POWER_DIOPTERS: 41.75
OD_K1POWER_DIOPTERS: 42.50
OS_AXISANGLE2_DEGREES: 180

## 2020-10-02 ASSESSMENT — REFRACTION_MANIFEST
OS_CYLINDER: +0.50
OS_SPHERE: +0.50
OD_ADD: +2.50
OS_AXIS: 150
OD_SPHERE: +0.25

## 2020-10-02 ASSESSMENT — REFRACTION_WEARINGRX
OD_AXIS: 013
OS_CYLINDER: +0.50
OS_SPHERE: PLANO
OD_ADD: +2.50
OD_CYLINDER: +0.25
OD_SPHERE: -0.25
OS_AXIS: 115
OS_ADD: +2.50

## 2020-10-02 ASSESSMENT — CONF VISUAL FIELD
OD_NORMAL: 1
OS_NORMAL: 1

## 2020-10-02 ASSESSMENT — CUP TO DISC RATIO
OS_RATIO: 0.7
OD_RATIO: 0.7

## 2020-10-02 ASSESSMENT — EXTERNAL EXAM - LEFT EYE: OS_EXAM: NORMAL

## 2020-10-02 ASSESSMENT — TONOMETRY
OS_IOP_MMHG: 16
OD_IOP_MMHG: 18
IOP_METHOD: APPLANATION

## 2020-10-02 ASSESSMENT — SLIT LAMP EXAM - LIDS
COMMENTS: NORMAL
COMMENTS: NORMAL

## 2020-10-02 ASSESSMENT — EXTERNAL EXAM - RIGHT EYE: OD_EXAM: NORMAL

## 2020-10-13 ENCOUNTER — OFFICE VISIT (OUTPATIENT)
Dept: OPHTHALMOLOGY | Age: 70
End: 2020-10-13

## 2020-10-13 DIAGNOSIS — H40.003 OPEN ANGLE WITH BORDERLINE GLAUCOMA FINDINGS, BILATERAL: ICD-10-CM

## 2020-10-13 PROCEDURE — 92083 EXTENDED VISUAL FIELD XM: CPT

## 2021-01-23 ENCOUNTER — E-ADVICE (OUTPATIENT)
Dept: INTERNAL MEDICINE | Age: 71
End: 2021-01-23

## 2021-01-31 ENCOUNTER — E-ADVICE (OUTPATIENT)
Dept: INTERNAL MEDICINE | Age: 71
End: 2021-01-31

## 2021-02-23 ENCOUNTER — E-ADVICE (OUTPATIENT)
Dept: INTERNAL MEDICINE | Age: 71
End: 2021-02-23

## 2021-02-23 DIAGNOSIS — I73.9 PAD (PERIPHERAL ARTERY DISEASE) (CMD): Primary | ICD-10-CM

## 2021-04-01 ENCOUNTER — ANCILLARY PROCEDURE (OUTPATIENT)
Dept: CARDIOLOGY | Age: 71
End: 2021-04-01
Attending: INTERNAL MEDICINE

## 2021-04-01 ENCOUNTER — OFFICE VISIT (OUTPATIENT)
Dept: CARDIOLOGY | Age: 71
End: 2021-04-01
Attending: INTERNAL MEDICINE

## 2021-04-01 VITALS
OXYGEN SATURATION: 98 % | WEIGHT: 158 LBS | BODY MASS INDEX: 26.98 KG/M2 | DIASTOLIC BLOOD PRESSURE: 76 MMHG | HEIGHT: 64 IN | HEART RATE: 61 BPM | SYSTOLIC BLOOD PRESSURE: 126 MMHG

## 2021-04-01 DIAGNOSIS — M87.052 AVASCULAR NECROSIS OF FEMORAL HEAD, LEFT (CMD): ICD-10-CM

## 2021-04-01 DIAGNOSIS — I87.2 VENOUS INSUFFICIENCY OF BOTH LOWER EXTREMITIES: ICD-10-CM

## 2021-04-01 DIAGNOSIS — M76.32 ILIOTIBIAL BAND SYNDROME OF LEFT SIDE: ICD-10-CM

## 2021-04-01 DIAGNOSIS — Z78.9 NONSMOKER: ICD-10-CM

## 2021-04-01 DIAGNOSIS — R73.01 IFG (IMPAIRED FASTING GLUCOSE): ICD-10-CM

## 2021-04-01 DIAGNOSIS — I73.9 PAD (PERIPHERAL ARTERY DISEASE) (CMD): ICD-10-CM

## 2021-04-01 DIAGNOSIS — E78.2 MIXED HYPERLIPIDEMIA: ICD-10-CM

## 2021-04-01 DIAGNOSIS — R60.0 LEG EDEMA: ICD-10-CM

## 2021-04-01 DIAGNOSIS — I73.9 PAD (PERIPHERAL ARTERY DISEASE) (CMD): Primary | ICD-10-CM

## 2021-04-01 PROCEDURE — 93922 UPR/L XTREMITY ART 2 LEVELS: CPT | Performed by: INTERNAL MEDICINE

## 2021-04-01 PROCEDURE — 99204 OFFICE O/P NEW MOD 45 MIN: CPT | Performed by: INTERNAL MEDICINE

## 2021-04-01 ASSESSMENT — ENCOUNTER SYMPTOMS
CONSTITUTIONAL NEGATIVE: 1
ENDOCRINE NEGATIVE: 1
COLOR CHANGE: 1
PSYCHIATRIC NEGATIVE: 1
EYES NEGATIVE: 1
ALLERGIC/IMMUNOLOGIC NEGATIVE: 1
HEMATOLOGIC/LYMPHATIC NEGATIVE: 1
GASTROINTESTINAL NEGATIVE: 1
RESPIRATORY NEGATIVE: 1

## 2021-04-19 ENCOUNTER — TELEPHONE (OUTPATIENT)
Dept: CARDIOLOGY | Age: 71
End: 2021-04-19

## 2021-05-17 ENCOUNTER — APPOINTMENT (OUTPATIENT)
Dept: CARDIOLOGY | Age: 71
End: 2021-05-17
Attending: INTERNAL MEDICINE

## 2021-07-05 ENCOUNTER — EXTERNAL RECORD (OUTPATIENT)
Dept: HEALTH INFORMATION MANAGEMENT | Facility: OTHER | Age: 71
End: 2021-07-05

## 2021-07-06 ENCOUNTER — APPOINTMENT (OUTPATIENT)
Dept: CARDIOLOGY | Age: 71
End: 2021-07-06

## 2021-07-13 ENCOUNTER — TELEPHONE (OUTPATIENT)
Dept: INTERNAL MEDICINE | Age: 71
End: 2021-07-13

## 2021-07-13 DIAGNOSIS — Z12.5 SCREENING FOR MALIGNANT NEOPLASM OF PROSTATE: ICD-10-CM

## 2021-07-13 DIAGNOSIS — R73.01 IFG (IMPAIRED FASTING GLUCOSE): Primary | ICD-10-CM

## 2021-07-13 DIAGNOSIS — E78.2 MIXED HYPERLIPIDEMIA: ICD-10-CM

## 2021-07-13 DIAGNOSIS — Z00.00 ROUTINE GENERAL MEDICAL EXAMINATION AT A HEALTH CARE FACILITY: ICD-10-CM

## 2021-07-14 ENCOUNTER — DOCUMENTATION (OUTPATIENT)
Dept: URGENT CARE | Age: 71
End: 2021-07-14

## 2021-07-14 ENCOUNTER — OFFICE VISIT (OUTPATIENT)
Dept: INTERNAL MEDICINE | Age: 71
End: 2021-07-14

## 2021-07-14 VITALS
BODY MASS INDEX: 25.61 KG/M2 | HEART RATE: 73 BPM | WEIGHT: 150 LBS | RESPIRATION RATE: 20 BRPM | OXYGEN SATURATION: 96 % | TEMPERATURE: 98.8 F | HEIGHT: 64 IN | DIASTOLIC BLOOD PRESSURE: 76 MMHG | SYSTOLIC BLOOD PRESSURE: 120 MMHG

## 2021-07-14 DIAGNOSIS — E55.9 VITAMIN D DEFICIENCY: ICD-10-CM

## 2021-07-14 DIAGNOSIS — R19.7 DIARRHEA IN ADULT PATIENT: Primary | ICD-10-CM

## 2021-07-14 PROCEDURE — 99214 OFFICE O/P EST MOD 30 MIN: CPT | Performed by: INTERNAL MEDICINE

## 2021-07-14 ASSESSMENT — PATIENT HEALTH QUESTIONNAIRE - PHQ9
CLINICAL INTERPRETATION OF PHQ2 SCORE: NO FURTHER SCREENING NEEDED
1. LITTLE INTEREST OR PLEASURE IN DOING THINGS: NOT AT ALL
2. FEELING DOWN, DEPRESSED OR HOPELESS: NOT AT ALL
CLINICAL INTERPRETATION OF PHQ9 SCORE: NO FURTHER SCREENING NEEDED
SUM OF ALL RESPONSES TO PHQ9 QUESTIONS 1 AND 2: 0
SUM OF ALL RESPONSES TO PHQ9 QUESTIONS 1 AND 2: 0

## 2021-07-14 ASSESSMENT — COGNITIVE AND FUNCTIONAL STATUS - GENERAL
DO YOU HAVE SERIOUS DIFFICULTY WALKING OR CLIMBING STAIRS: NO
BECAUSE OF A PHYSICAL, MENTAL, OR EMOTIONAL CONDITION, DO YOU HAVE SERIOUS DIFFICULTY CONCENTRATING, REMEMBERING OR MAKING DECISIONS: NO
DO YOU HAVE DIFFICULTY DRESSING OR BATHING: NO
BECAUSE OF A PHYSICAL, MENTAL, OR EMOTIONAL CONDITION, DO YOU HAVE DIFFICULTY DOING ERRANDS ALONE: NO

## 2021-07-15 ENCOUNTER — LAB SERVICES (OUTPATIENT)
Dept: LAB | Age: 71
End: 2021-07-15

## 2021-07-15 DIAGNOSIS — R19.7 DIARRHEA IN ADULT PATIENT: ICD-10-CM

## 2021-07-15 LAB
C DIFF TOX A+B STL QL IA: NEGATIVE
C PARVUM AG STL QL IA: NEGATIVE
G LAMBLIA AG STL QL IA: NEGATIVE

## 2021-07-15 PROCEDURE — 87899 AGENT NOS ASSAY W/OPTIC: CPT | Performed by: INTERNAL MEDICINE

## 2021-07-15 PROCEDURE — 87329 GIARDIA AG IA: CPT | Performed by: INTERNAL MEDICINE

## 2021-07-15 PROCEDURE — 87045 FECES CULTURE AEROBIC BACT: CPT | Performed by: INTERNAL MEDICINE

## 2021-07-15 PROCEDURE — 87493 C DIFF AMPLIFIED PROBE: CPT | Performed by: INTERNAL MEDICINE

## 2021-07-15 PROCEDURE — 87328 CRYPTOSPORIDIUM AG IA: CPT | Performed by: INTERNAL MEDICINE

## 2021-07-17 DIAGNOSIS — R19.7 DIARRHEA IN ADULT PATIENT: Primary | ICD-10-CM

## 2021-07-17 LAB — FINAL REPORT: NORMAL

## 2021-07-27 ENCOUNTER — LAB SERVICES (OUTPATIENT)
Dept: LAB | Age: 71
End: 2021-07-27

## 2021-07-27 DIAGNOSIS — Z12.5 SCREENING FOR MALIGNANT NEOPLASM OF PROSTATE: ICD-10-CM

## 2021-07-27 DIAGNOSIS — Z00.00 ROUTINE GENERAL MEDICAL EXAMINATION AT A HEALTH CARE FACILITY: ICD-10-CM

## 2021-07-27 DIAGNOSIS — E55.9 VITAMIN D DEFICIENCY: ICD-10-CM

## 2021-07-27 DIAGNOSIS — E78.2 MIXED HYPERLIPIDEMIA: ICD-10-CM

## 2021-07-27 DIAGNOSIS — R73.01 IFG (IMPAIRED FASTING GLUCOSE): ICD-10-CM

## 2021-07-27 LAB
25(OH)D3 SERPL-MCNC: 24.2 NG/ML (ref 30–100)
ALBUMIN SERPL-MCNC: 4 G/DL (ref 3.6–5.1)
ALP SERPL-CCNC: 112 U/L (ref 45–115)
ALT SERPL W/O P-5'-P-CCNC: 39 U/L (ref 5–49)
AST SERPL-CCNC: 35 U/L (ref 14–43)
BASOPHIL %: 0.4 % (ref 0–1.2)
BASOPHIL ABSOLUTE #: 0 10*3/UL (ref 0–0.1)
BILIRUB SERPL-MCNC: 0.6 MG/DL (ref 0–1.3)
BUN SERPL-MCNC: 9 MG/DL (ref 6–27)
CALCIUM SERPL-MCNC: 8.8 MG/DL (ref 8.6–10.6)
CHLORIDE SERPL-SCNC: 102 MMOL/L (ref 96–107)
CHOLEST SERPL-MCNC: 151 MG/DL (ref 140–200)
CO2 SERPL-SCNC: 24 MMOL/L (ref 22–32)
CREAT SERPL-MCNC: 1.2 MG/DL (ref 0.6–1.6)
DIFFERENTIAL TYPE: ABNORMAL
EOSINOPHIL %: 1.8 % (ref 0–10)
EOSINOPHIL ABSOLUTE #: 0.1 10*3/UL (ref 0–0.5)
EST. AVERAGE GLUCOSE BLD GHB EST-MCNC: 119 MG/DL (ref 0–154)
GFR SERPL CREATININE-BSD FRML MDRD: 60 ML/MIN/{1.73M2}
GFR SERPL CREATININE-BSD FRML MDRD: >60 ML/MIN/{1.73M2}
GLUCOSE P FAST SERPL-MCNC: 102 MG/DL (ref 60–100)
HBA1C MFR BLD: 5.8 % (ref 4.2–6)
HDLC SERPL-MCNC: 30 MG/DL
HEMATOCRIT: 45 % (ref 40–51)
HEMOGLOBIN: 15.3 G/DL (ref 13.7–17.5)
IMMATURE GRANULOCYTE ABSOLUTE: 0.01 10*3/UL (ref 0–0.05)
IMMATURE GRANULOCYTE PERCENT: 0.2 % (ref 0–0.5)
LDLC SERPL CALC-MCNC: 99 MG/DL (ref 30–100)
LYMPH PERCENT: 28.1 % (ref 20.5–51.1)
LYMPHOCYTE ABSOLUTE #: 1.4 10*3/UL (ref 1.2–3.4)
MEAN CORPUSCULAR HGB CONCENTRATION: 34 % (ref 32–36)
MEAN CORPUSCULAR HGB: 30.4 PG (ref 27–34)
MEAN CORPUSCULAR VOLUME: 89.5 FL (ref 79–95)
MEAN PLATELET VOLUME: 11.3 FL (ref 8.6–12.4)
MONOCYTE ABSOLUTE #: 0.8 10*3/UL (ref 0.2–0.9)
MONOCYTE PERCENT: 15.3 % (ref 4.3–12.9)
NEUTROPHIL ABSOLUTE #: 2.7 10*3/UL (ref 1.4–6.5)
NEUTROPHIL PERCENT: 54.2 % (ref 34–73.5)
PLATELET COUNT: 179 10*3/UL (ref 150–400)
POTASSIUM SERPL-SCNC: 4.7 MMOL/L (ref 3.5–5.3)
PROT SERPL-MCNC: 6.7 G/DL (ref 6.4–8.5)
PSA SERPL-MCNC: 1.42 NG/ML (ref 0–5.3)
RED BLOOD CELL COUNT: 5.03 10*6/UL (ref 3.9–5.7)
RED CELL DISTRIBUTION WIDTH: 12.5 % (ref 11.3–14.8)
SODIUM SERPL-SCNC: 136 MMOL/L (ref 136–146)
TRIGL SERPL-MCNC: 109 MG/DL (ref 0–200)
TSH SERPL DL<=0.05 MIU/L-ACNC: 3.48 M[IU]/L (ref 0.3–4.82)
WHITE BLOOD CELL COUNT: 5 10*3/UL (ref 4–10)

## 2021-07-27 PROCEDURE — 80061 LIPID PANEL: CPT | Performed by: INTERNAL MEDICINE

## 2021-07-27 PROCEDURE — G0103 PSA SCREENING: HCPCS | Performed by: INTERNAL MEDICINE

## 2021-07-27 PROCEDURE — 36415 COLL VENOUS BLD VENIPUNCTURE: CPT | Performed by: INTERNAL MEDICINE

## 2021-07-27 PROCEDURE — 83036 HEMOGLOBIN GLYCOSYLATED A1C: CPT | Performed by: INTERNAL MEDICINE

## 2021-07-27 PROCEDURE — 85025 COMPLETE CBC W/AUTO DIFF WBC: CPT | Performed by: INTERNAL MEDICINE

## 2021-07-27 PROCEDURE — 84443 ASSAY THYROID STIM HORMONE: CPT | Performed by: INTERNAL MEDICINE

## 2021-07-27 PROCEDURE — 80053 COMPREHEN METABOLIC PANEL: CPT | Performed by: INTERNAL MEDICINE

## 2021-07-27 PROCEDURE — 82306 VITAMIN D 25 HYDROXY: CPT | Performed by: INTERNAL MEDICINE

## 2021-07-28 ENCOUNTER — OFFICE VISIT (OUTPATIENT)
Dept: INTERNAL MEDICINE | Age: 71
End: 2021-07-28

## 2021-07-28 VITALS
TEMPERATURE: 97.5 F | DIASTOLIC BLOOD PRESSURE: 76 MMHG | RESPIRATION RATE: 18 BRPM | SYSTOLIC BLOOD PRESSURE: 122 MMHG | OXYGEN SATURATION: 97 % | HEIGHT: 65 IN | HEART RATE: 80 BPM | WEIGHT: 149 LBS | BODY MASS INDEX: 24.83 KG/M2

## 2021-07-28 DIAGNOSIS — M17.12 PRIMARY OSTEOARTHRITIS OF LEFT KNEE: ICD-10-CM

## 2021-07-28 DIAGNOSIS — E78.2 MIXED HYPERLIPIDEMIA: ICD-10-CM

## 2021-07-28 DIAGNOSIS — K52.9 CHRONIC DIARRHEA: Primary | ICD-10-CM

## 2021-07-28 DIAGNOSIS — R73.01 IFG (IMPAIRED FASTING GLUCOSE): ICD-10-CM

## 2021-07-28 DIAGNOSIS — H40.003 OPEN ANGLE WITH BORDERLINE GLAUCOMA FINDINGS, BILATERAL: ICD-10-CM

## 2021-07-28 DIAGNOSIS — E55.9 VITAMIN D DEFICIENCY: ICD-10-CM

## 2021-07-28 DIAGNOSIS — K42.9 UMBILICAL HERNIA WITHOUT OBSTRUCTION AND WITHOUT GANGRENE: ICD-10-CM

## 2021-07-28 PROCEDURE — 99215 OFFICE O/P EST HI 40 MIN: CPT | Performed by: INTERNAL MEDICINE

## 2021-07-28 PROCEDURE — G0439 PPPS, SUBSEQ VISIT: HCPCS | Performed by: INTERNAL MEDICINE

## 2021-07-28 RX ORDER — ERGOCALCIFEROL 1.25 MG/1
50000 CAPSULE ORAL
Qty: 12 CAPSULE | Refills: 0 | Status: SHIPPED | OUTPATIENT
Start: 2021-08-02

## 2021-07-28 RX ORDER — ERGOCALCIFEROL 1.25 MG/1
50000 CAPSULE ORAL
Qty: 12 CAPSULE | Refills: 0 | Status: SHIPPED | OUTPATIENT
Start: 2021-08-02 | End: 2021-07-28 | Stop reason: SDUPTHER

## 2021-07-28 ASSESSMENT — PATIENT HEALTH QUESTIONNAIRE - PHQ9
SUM OF ALL RESPONSES TO PHQ9 QUESTIONS 1 AND 2: 0
SUM OF ALL RESPONSES TO PHQ9 QUESTIONS 1 AND 2: 0
CLINICAL INTERPRETATION OF PHQ9 SCORE: NO FURTHER SCREENING NEEDED
CLINICAL INTERPRETATION OF PHQ2 SCORE: NO FURTHER SCREENING NEEDED
2. FEELING DOWN, DEPRESSED OR HOPELESS: NOT AT ALL
1. LITTLE INTEREST OR PLEASURE IN DOING THINGS: NOT AT ALL

## 2021-07-28 ASSESSMENT — COGNITIVE AND FUNCTIONAL STATUS - GENERAL
BECAUSE OF A PHYSICAL, MENTAL, OR EMOTIONAL CONDITION, DO YOU HAVE DIFFICULTY DOING ERRANDS ALONE: NO
DO YOU HAVE SERIOUS DIFFICULTY WALKING OR CLIMBING STAIRS: NO
DO YOU HAVE DIFFICULTY DRESSING OR BATHING: NO
BECAUSE OF A PHYSICAL, MENTAL, OR EMOTIONAL CONDITION, DO YOU HAVE SERIOUS DIFFICULTY CONCENTRATING, REMEMBERING OR MAKING DECISIONS: NO

## 2021-07-31 ENCOUNTER — TELEPHONE (OUTPATIENT)
Dept: URGENT CARE | Age: 71
End: 2021-07-31

## 2021-07-31 ENCOUNTER — WALK IN (OUTPATIENT)
Dept: URGENT CARE | Age: 71
End: 2021-07-31

## 2021-07-31 VITALS
SYSTOLIC BLOOD PRESSURE: 136 MMHG | HEART RATE: 74 BPM | OXYGEN SATURATION: 99 % | RESPIRATION RATE: 20 BRPM | TEMPERATURE: 97.4 F | DIASTOLIC BLOOD PRESSURE: 80 MMHG

## 2021-07-31 DIAGNOSIS — M79.675 PAIN IN TOE OF LEFT FOOT: ICD-10-CM

## 2021-07-31 PROCEDURE — 99214 OFFICE O/P EST MOD 30 MIN: CPT | Performed by: INTERNAL MEDICINE

## 2021-07-31 RX ORDER — DICLOFENAC SODIUM 75 MG/1
75 TABLET, DELAYED RELEASE ORAL 2 TIMES DAILY
Qty: 30 TABLET | Refills: 0 | Status: SHIPPED | OUTPATIENT
Start: 2021-07-31

## 2021-08-11 ENCOUNTER — IMAGING SERVICES (OUTPATIENT)
Dept: CT IMAGING | Age: 71
End: 2021-08-11
Attending: INTERNAL MEDICINE

## 2021-08-11 ENCOUNTER — APPOINTMENT (OUTPATIENT)
Dept: CT IMAGING | Age: 71
End: 2021-08-11
Attending: INTERNAL MEDICINE

## 2021-08-11 DIAGNOSIS — K52.9 CHRONIC DIARRHEA: Primary | ICD-10-CM

## 2021-08-11 DIAGNOSIS — K52.9 CHRONIC DIARRHEA: ICD-10-CM

## 2021-08-11 LAB — GFR SERPLBLD BASED ON 1.73 SQ M-ARVRAT: >60 ML/MIN

## 2021-08-11 PROCEDURE — 74177 CT ABD & PELVIS W/CONTRAST: CPT | Performed by: RADIOLOGY

## 2021-08-11 PROCEDURE — G1004 CDSM NDSC: HCPCS | Performed by: RADIOLOGY

## 2021-08-11 PROCEDURE — 82565 ASSAY OF CREATININE: CPT | Performed by: INTERNAL MEDICINE

## 2021-08-12 DIAGNOSIS — N32.89 BLADDER WALL THICKENING: ICD-10-CM

## 2021-08-12 DIAGNOSIS — N40.0 ENLARGED PROSTATE: Primary | ICD-10-CM

## 2021-08-19 ENCOUNTER — IMAGING SERVICES (OUTPATIENT)
Dept: GENERAL RADIOLOGY | Age: 71
End: 2021-08-19
Attending: INTERNAL MEDICINE

## 2021-08-19 ENCOUNTER — OFFICE VISIT (OUTPATIENT)
Dept: INTERNAL MEDICINE | Age: 71
End: 2021-08-19

## 2021-08-19 VITALS
HEART RATE: 67 BPM | SYSTOLIC BLOOD PRESSURE: 122 MMHG | WEIGHT: 149 LBS | RESPIRATION RATE: 18 BRPM | TEMPERATURE: 97.7 F | OXYGEN SATURATION: 96 % | HEIGHT: 63 IN | BODY MASS INDEX: 26.4 KG/M2 | DIASTOLIC BLOOD PRESSURE: 64 MMHG

## 2021-08-19 DIAGNOSIS — M79.675 PAIN IN TOE OF LEFT FOOT: Primary | ICD-10-CM

## 2021-08-19 DIAGNOSIS — M79.675 PAIN IN TOE OF LEFT FOOT: ICD-10-CM

## 2021-08-19 DIAGNOSIS — S91.209A AVULSION OF TOENAIL, INITIAL ENCOUNTER: ICD-10-CM

## 2021-08-19 PROCEDURE — 99214 OFFICE O/P EST MOD 30 MIN: CPT | Performed by: INTERNAL MEDICINE

## 2021-08-19 PROCEDURE — 73660 X-RAY EXAM OF TOE(S): CPT | Performed by: RADIOLOGY

## 2021-08-19 RX ORDER — CEFUROXIME AXETIL 500 MG/1
500 TABLET ORAL 2 TIMES DAILY
Qty: 20 TABLET | Refills: 0 | Status: SHIPPED | OUTPATIENT
Start: 2021-08-19 | End: 2021-09-02

## 2021-08-19 ASSESSMENT — PATIENT HEALTH QUESTIONNAIRE - PHQ9
CLINICAL INTERPRETATION OF PHQ9 SCORE: NO FURTHER SCREENING NEEDED
2. FEELING DOWN, DEPRESSED OR HOPELESS: NOT AT ALL
1. LITTLE INTEREST OR PLEASURE IN DOING THINGS: NOT AT ALL
CLINICAL INTERPRETATION OF PHQ2 SCORE: NO FURTHER SCREENING NEEDED
SUM OF ALL RESPONSES TO PHQ9 QUESTIONS 1 AND 2: 0
SUM OF ALL RESPONSES TO PHQ9 QUESTIONS 1 AND 2: 0

## 2021-08-19 ASSESSMENT — PAIN SCALES - GENERAL: PAINLEVEL: 0

## 2021-08-25 ENCOUNTER — E-ADVICE (OUTPATIENT)
Dept: INTERNAL MEDICINE | Age: 71
End: 2021-08-25

## 2021-08-25 DIAGNOSIS — S91.209S AVULSION OF TOENAIL, SEQUELA: Primary | ICD-10-CM

## 2021-08-30 ENCOUNTER — OFFICE VISIT (OUTPATIENT)
Dept: GASTROENTEROLOGY | Age: 71
End: 2021-08-30
Attending: INTERNAL MEDICINE

## 2021-08-30 VITALS — WEIGHT: 149 LBS | HEIGHT: 63 IN | BODY MASS INDEX: 26.4 KG/M2

## 2021-08-30 DIAGNOSIS — K52.9 CHRONIC DIARRHEA: Primary | ICD-10-CM

## 2021-08-30 PROCEDURE — 99204 OFFICE O/P NEW MOD 45 MIN: CPT | Performed by: INTERNAL MEDICINE

## 2021-08-30 RX ORDER — METRONIDAZOLE 500 MG/1
500 TABLET ORAL 3 TIMES DAILY
Qty: 30 TABLET | Refills: 0 | Status: SHIPPED | OUTPATIENT
Start: 2021-08-30 | End: 2021-09-22 | Stop reason: ALTCHOICE

## 2021-08-31 ENCOUNTER — TELEPHONE (OUTPATIENT)
Dept: INTERNAL MEDICINE | Age: 71
End: 2021-08-31

## 2021-09-02 RX ORDER — CEPHALEXIN 500 MG/1
500 CAPSULE ORAL 3 TIMES DAILY
Qty: 21 CAPSULE | Refills: 0 | Status: SHIPPED | OUTPATIENT
Start: 2021-09-02 | End: 2021-09-09

## 2021-09-09 ENCOUNTER — OFFICE VISIT (OUTPATIENT)
Dept: PODIATRY | Age: 71
End: 2021-09-09

## 2021-09-09 VITALS — WEIGHT: 150 LBS | BODY MASS INDEX: 26.58 KG/M2 | HEIGHT: 63 IN

## 2021-09-09 DIAGNOSIS — M79.675 GREAT TOE PAIN, LEFT: ICD-10-CM

## 2021-09-09 DIAGNOSIS — S91.209A NAIL AVULSION OF TOE, INITIAL ENCOUNTER: Primary | ICD-10-CM

## 2021-09-09 PROCEDURE — 99203 OFFICE O/P NEW LOW 30 MIN: CPT | Performed by: NURSE PRACTITIONER

## 2021-09-16 ASSESSMENT — ENCOUNTER SYMPTOMS
BRUISES/BLEEDS EASILY: 0
CHILLS: 0
COLOR CHANGE: 0
SHORTNESS OF BREATH: 0
WOUND: 0
VOMITING: 0
POLYDIPSIA: 0
FEVER: 0
DIZZINESS: 0
NAUSEA: 0

## 2021-09-22 ENCOUNTER — OFFICE VISIT (OUTPATIENT)
Dept: UROLOGY | Age: 71
End: 2021-09-22

## 2021-09-22 VITALS — HEIGHT: 64 IN | TEMPERATURE: 97.7 F | WEIGHT: 150 LBS | BODY MASS INDEX: 25.61 KG/M2

## 2021-09-22 DIAGNOSIS — N40.1 BENIGN PROSTATIC HYPERPLASIA WITH WEAK URINARY STREAM: Primary | ICD-10-CM

## 2021-09-22 DIAGNOSIS — R39.12 BENIGN PROSTATIC HYPERPLASIA WITH WEAK URINARY STREAM: Primary | ICD-10-CM

## 2021-09-22 LAB — BLDR SCAN MLS: 68

## 2021-09-22 PROCEDURE — 99204 OFFICE O/P NEW MOD 45 MIN: CPT | Performed by: UROLOGY

## 2021-09-22 PROCEDURE — 51798 US URINE CAPACITY MEASURE: CPT | Performed by: UROLOGY

## 2021-09-22 RX ORDER — TAMSULOSIN HYDROCHLORIDE 0.4 MG/1
0.4 CAPSULE ORAL
Qty: 90 CAPSULE | Refills: 3 | Status: SHIPPED | OUTPATIENT
Start: 2021-09-22 | End: 2022-09-22

## 2021-09-30 ENCOUNTER — APPOINTMENT (OUTPATIENT)
Dept: PODIATRY | Age: 71
End: 2021-09-30

## 2021-10-04 ENCOUNTER — OFFICE VISIT (OUTPATIENT)
Dept: OPHTHALMOLOGY | Age: 71
End: 2021-10-04
Attending: INTERNAL MEDICINE

## 2021-10-04 DIAGNOSIS — H04.123 DRY EYES: Primary | ICD-10-CM

## 2021-10-04 DIAGNOSIS — H40.013 OPEN ANGLE WITH BORDERLINE FINDINGS AND LOW GLAUCOMA RISK IN BOTH EYES: ICD-10-CM

## 2021-10-04 PROCEDURE — 99212 OFFICE O/P EST SF 10 MIN: CPT | Performed by: OPHTHALMOLOGY

## 2021-10-20 ENCOUNTER — OFFICE VISIT (OUTPATIENT)
Dept: PODIATRY | Age: 71
End: 2021-10-20

## 2021-10-20 DIAGNOSIS — M79.675 GREAT TOE PAIN, LEFT: Primary | ICD-10-CM

## 2021-10-20 PROCEDURE — 99212 OFFICE O/P EST SF 10 MIN: CPT | Performed by: NURSE PRACTITIONER

## 2021-12-02 ENCOUNTER — APPOINTMENT (OUTPATIENT)
Dept: OPHTHALMOLOGY | Age: 71
End: 2021-12-02

## 2022-04-27 ENCOUNTER — EXTERNAL RECORD (OUTPATIENT)
Dept: HEALTH INFORMATION MANAGEMENT | Facility: OTHER | Age: 72
End: 2022-04-27

## 2022-08-09 ENCOUNTER — OFFICE VISIT (OUTPATIENT)
Dept: INTERNAL MEDICINE CLINIC | Facility: CLINIC | Age: 72
End: 2022-08-09
Payer: MEDICARE

## 2022-08-09 VITALS
SYSTOLIC BLOOD PRESSURE: 124 MMHG | HEART RATE: 70 BPM | HEIGHT: 63.78 IN | RESPIRATION RATE: 16 BRPM | TEMPERATURE: 98 F | BODY MASS INDEX: 24.71 KG/M2 | OXYGEN SATURATION: 99 % | WEIGHT: 143 LBS | DIASTOLIC BLOOD PRESSURE: 70 MMHG

## 2022-08-09 DIAGNOSIS — E55.9 VITAMIN D DEFICIENCY: ICD-10-CM

## 2022-08-09 DIAGNOSIS — Z00.00 ENCOUNTER FOR ANNUAL HEALTH EXAMINATION: Primary | ICD-10-CM

## 2022-08-09 DIAGNOSIS — M17.12 PRIMARY OSTEOARTHRITIS OF LEFT KNEE: ICD-10-CM

## 2022-08-09 DIAGNOSIS — E78.2 MIXED HYPERLIPIDEMIA: ICD-10-CM

## 2022-08-09 DIAGNOSIS — I87.2 VENOUS STASIS DERMATITIS OF BOTH LOWER EXTREMITIES: ICD-10-CM

## 2022-08-09 DIAGNOSIS — Z12.5 PROSTATE CANCER SCREENING: ICD-10-CM

## 2022-08-09 DIAGNOSIS — M87.052 AVASCULAR NECROSIS OF FEMORAL HEAD, LEFT (HCC): ICD-10-CM

## 2022-08-09 DIAGNOSIS — R73.01 IFG (IMPAIRED FASTING GLUCOSE): ICD-10-CM

## 2022-08-09 DIAGNOSIS — I87.2 CHRONIC VENOUS INSUFFICIENCY: ICD-10-CM

## 2022-08-09 PROCEDURE — G0439 PPPS, SUBSEQ VISIT: HCPCS | Performed by: INTERNAL MEDICINE

## 2022-08-09 PROCEDURE — 96160 PT-FOCUSED HLTH RISK ASSMT: CPT | Performed by: INTERNAL MEDICINE

## 2022-08-09 PROCEDURE — 1125F AMNT PAIN NOTED PAIN PRSNT: CPT | Performed by: INTERNAL MEDICINE

## 2022-08-09 PROCEDURE — 3008F BODY MASS INDEX DOCD: CPT | Performed by: INTERNAL MEDICINE

## 2022-08-09 PROCEDURE — 3078F DIAST BP <80 MM HG: CPT | Performed by: INTERNAL MEDICINE

## 2022-08-09 PROCEDURE — 3074F SYST BP LT 130 MM HG: CPT | Performed by: INTERNAL MEDICINE

## 2022-08-09 PROCEDURE — 99387 INIT PM E/M NEW PAT 65+ YRS: CPT | Performed by: INTERNAL MEDICINE

## 2022-08-09 RX ORDER — HYDROCORTISONE 25 MG/G
1 CREAM TOPICAL 2 TIMES DAILY
Qty: 28 G | Refills: 1 | Status: SHIPPED | OUTPATIENT
Start: 2022-08-09

## 2022-08-25 LAB
ALBUMIN/GLOBULIN RATIO: 1.6 (CALC) (ref 1–2.5)
ALBUMIN: 4.1 G/DL (ref 3.6–5.1)
ALKALINE PHOSPHATASE: 91 U/L (ref 35–144)
ALT: 34 U/L (ref 9–46)
AST: 27 U/L (ref 10–35)
BILIRUBIN, TOTAL: 0.7 MG/DL (ref 0.2–1.2)
BUN: 10 MG/DL (ref 7–25)
CALCIUM: 8.8 MG/DL (ref 8.6–10.3)
CARBON DIOXIDE: 26 MMOL/L (ref 20–32)
CHLORIDE: 101 MMOL/L (ref 98–110)
CHOL/HDLC RATIO: 4.2 (CALC)
CHOLESTEROL, TOTAL: 160 MG/DL
CREATININE: 1.14 MG/DL (ref 0.7–1.28)
EGFR: 69 ML/MIN/1.73M2
GLOBULIN: 2.6 G/DL (CALC) (ref 1.9–3.7)
GLUCOSE: 102 MG/DL (ref 65–99)
HDL CHOLESTEROL: 38 MG/DL
HEMOGLOBIN A1C: 5.5 % OF TOTAL HGB
LDL-CHOLESTEROL: 101 MG/DL (CALC)
NON-HDL CHOLESTEROL: 122 MG/DL (CALC)
POTASSIUM: 4.2 MMOL/L (ref 3.5–5.3)
PROTEIN, TOTAL: 6.7 G/DL (ref 6.1–8.1)
PSA, TOTAL: 0.94 NG/ML
SODIUM: 134 MMOL/L (ref 135–146)
TRIGLYCERIDES: 115 MG/DL
VITAMIN D, 25-OH, TOTAL: 27 NG/ML (ref 30–100)

## 2022-09-14 ENCOUNTER — PATIENT MESSAGE (OUTPATIENT)
Dept: INTERNAL MEDICINE CLINIC | Facility: CLINIC | Age: 72
End: 2022-09-14

## 2022-09-15 RX ORDER — HYDROCORTISONE 25 MG/G
1 CREAM TOPICAL 2 TIMES DAILY
Qty: 28 G | Refills: 1 | Status: SHIPPED | OUTPATIENT
Start: 2022-09-15

## 2022-09-15 NOTE — TELEPHONE ENCOUNTER
From: Roby Chiu  To: Gabriela Hooker MD  Sent: 9/14/2022 7:51 PM CDT  Subject: Renew my medication     Hi,  Please send my hydrocortisone 2.5 % Crea Commonly known as: Anusol-HC , prescription to [facility] in [place]. I have soothing Pain relief from Burning, itching and discomfort but still I feel Swollen Hemorrhoidal Tissue. So I would like to continue with this ointment or any other better one if you want to prescribe. Thanking you,  Roby Chiu.

## 2022-09-15 NOTE — TELEPHONE ENCOUNTER
Last VISIT 08/09/22      Last CPE 08/09/22    Last REFILL 08/09/22 qty 28g w/1 refill    Last LABS 08/24/22 CPE labs done    No future appointments. Please Approve or Deny.

## 2022-11-29 RX ORDER — ERGOCALCIFEROL 1.25 MG/1
CAPSULE ORAL
Qty: 12 CAPSULE | Refills: 0 | OUTPATIENT
Start: 2022-11-29

## 2023-03-06 RX ORDER — HYDROCORTISONE 25 MG/G
1 CREAM TOPICAL 2 TIMES DAILY
Qty: 28 G | Refills: 1 | Status: SHIPPED | OUTPATIENT
Start: 2023-03-06

## 2023-03-06 NOTE — TELEPHONE ENCOUNTER
Last OV 8.9.22 w/ AD (NP/CPE)   Last PE 8.9.22  Last REFILL 9.15.22 Hydrocortisone 2.5% cream #28g 1R  Last LABS 8.24.22 PSA, VitD, HgA1c, Lipid, CMP    Future Appointments   Date Time Provider Shantanu Richardson   4/3/2023  3:00 PM Shukri Mancera, DO [facility]         Per PROTOCOL?  FAILED-No appt in last 6 months or next 3 months     Please Advise

## 2023-05-08 ENCOUNTER — PATIENT MESSAGE (OUTPATIENT)
Dept: INTERNAL MEDICINE CLINIC | Facility: CLINIC | Age: 73
End: 2023-05-08

## 2023-05-09 ENCOUNTER — TELEPHONE (OUTPATIENT)
Dept: INTERNAL MEDICINE CLINIC | Facility: CLINIC | Age: 73
End: 2023-05-09

## 2023-05-09 DIAGNOSIS — M25.569 ACUTE KNEE PAIN, UNSPECIFIED LATERALITY: ICD-10-CM

## 2023-05-09 DIAGNOSIS — M25.559 ACUTE HIP PAIN, UNSPECIFIED LATERALITY: Primary | ICD-10-CM

## 2023-05-09 NOTE — TELEPHONE ENCOUNTER
LOV 8/9/22 with AD. AD, referral is pended as STAT/URGENT. Not sure if this can be pursued for an appt today 5/9/23 at 10:20am but we can try. OK to order?

## 2023-05-09 NOTE — TELEPHONE ENCOUNTER
From: Thao Win  To: Alek Canas MD  Sent: 5/8/2023 8:25 PM CDT  Subject: Referral for orthopedic surgeon     Hi,  I have an appointment with Dr. Vanessa Parnell MD on Tuesday May 9th at 10:20 am. Please send a referral for him as I have to go on urgent basis for my Hip and Knee pain. His address is: [address] [place] [address]; [facility]. Phone number: [phone] associated with [facility]. Thank you for your understanding and cooperation. Thao Win.

## 2023-05-10 NOTE — TELEPHONE ENCOUNTER
Type Date User Summary Attachment   Prior Authorization Confirmed/Denied/NA 05/09/2023 10:58 AM Farzana Irvin - -   Note:    Approved per [employer]

## 2023-05-30 ENCOUNTER — PATIENT MESSAGE (OUTPATIENT)
Dept: INTERNAL MEDICINE CLINIC | Facility: CLINIC | Age: 73
End: 2023-05-30

## 2023-05-30 DIAGNOSIS — Z12.5 SCREENING FOR MALIGNANT NEOPLASM OF PROSTATE: ICD-10-CM

## 2023-05-30 DIAGNOSIS — Z00.00 ROUTINE GENERAL MEDICAL EXAMINATION AT A HEALTH CARE FACILITY: Primary | ICD-10-CM

## 2023-05-30 NOTE — TELEPHONE ENCOUNTER
From: Petros Bhatia  To: Dempsey Landau, MD  Sent: 5/30/2023 10:51 AM CDT  Subject: Upcoming visit on June 12th    Hi,   Is it okay if I ago for my blood work this week even though my appointment with you is on June 12th. This is because I have my colonoscopy on June 5 th and after that I am going out. Please let me know. Thank you.

## 2023-05-31 NOTE — TELEPHONE ENCOUNTER
Thank you. Patient notified. Preferred Lab is [facility] and PSA was ordered for [facility] lab. Order pended for approval to have completed through [employer] [facility] to be signed if appropriate. Thank you.

## 2023-06-12 ENCOUNTER — OFFICE VISIT (OUTPATIENT)
Dept: INTERNAL MEDICINE CLINIC | Facility: CLINIC | Age: 73
End: 2023-06-12
Payer: MEDICARE

## 2023-06-12 VITALS
WEIGHT: 143 LBS | DIASTOLIC BLOOD PRESSURE: 74 MMHG | TEMPERATURE: 98 F | BODY MASS INDEX: 24.41 KG/M2 | OXYGEN SATURATION: 99 % | HEIGHT: 64 IN | RESPIRATION RATE: 18 BRPM | SYSTOLIC BLOOD PRESSURE: 136 MMHG | HEART RATE: 76 BPM

## 2023-06-12 DIAGNOSIS — M16.12 PRIMARY OSTEOARTHRITIS OF LEFT HIP: ICD-10-CM

## 2023-06-12 DIAGNOSIS — Z00.00 ENCOUNTER FOR ANNUAL HEALTH EXAMINATION: Primary | ICD-10-CM

## 2023-06-12 DIAGNOSIS — M17.12 PRIMARY OSTEOARTHRITIS OF LEFT KNEE: ICD-10-CM

## 2023-06-12 DIAGNOSIS — I87.2 VENOUS STASIS DERMATITIS OF BOTH LOWER EXTREMITIES: ICD-10-CM

## 2023-06-12 DIAGNOSIS — L98.9 SKIN LESION: ICD-10-CM

## 2023-06-12 DIAGNOSIS — M87.052 AVASCULAR NECROSIS OF FEMORAL HEAD, LEFT (HCC): ICD-10-CM

## 2023-06-12 DIAGNOSIS — R73.03 PREDIABETES: ICD-10-CM

## 2023-06-12 PROBLEM — E78.2 MIXED HYPERLIPIDEMIA: Status: RESOLVED | Noted: 2022-08-09 | Resolved: 2023-06-12

## 2023-06-12 PROBLEM — R73.01 IFG (IMPAIRED FASTING GLUCOSE): Status: RESOLVED | Noted: 2022-08-09 | Resolved: 2023-06-12

## 2023-06-12 PROBLEM — Z98.890 S/P ROTATOR CUFF REPAIR: Status: RESOLVED | Noted: 2017-03-15 | Resolved: 2023-06-12

## 2023-06-12 PROBLEM — E55.9 VITAMIN D DEFICIENCY: Status: RESOLVED | Noted: 2022-08-09 | Resolved: 2023-06-12

## 2023-06-12 PROBLEM — M75.121 COMPLETE TEAR OF RIGHT ROTATOR CUFF: Status: RESOLVED | Noted: 2017-01-05 | Resolved: 2023-06-12

## 2023-06-12 RX ORDER — HYDROCORTISONE ACETATE 25 MG/1
25 SUPPOSITORY RECTAL 2 TIMES DAILY
Qty: 7 SUPPOSITORY | Refills: 1 | Status: SHIPPED | OUTPATIENT
Start: 2023-06-12 | End: 2023-06-26

## 2023-06-12 RX ORDER — CLOTRIMAZOLE AND BETAMETHASONE DIPROPIONATE 10; .64 MG/G; MG/G
1 CREAM TOPICAL 2 TIMES DAILY PRN
Qty: 1 EACH | Refills: 1 | Status: SHIPPED | OUTPATIENT
Start: 2023-06-12

## 2023-06-12 RX ORDER — HYDROCORTISONE 25 MG/G
1 CREAM TOPICAL 2 TIMES DAILY
Qty: 1 EACH | Refills: 1 | Status: SHIPPED | OUTPATIENT
Start: 2023-06-12

## 2023-06-20 ENCOUNTER — TELEPHONE (OUTPATIENT)
Dept: INTERNAL MEDICINE CLINIC | Facility: CLINIC | Age: 73
End: 2023-06-20

## 2023-06-20 NOTE — TELEPHONE ENCOUNTER
Disp Refills Start End    hydrocortisone (ANUSOL-HC) 25 MG Rectal Suppos 7 suppository 1 6/20/2023 7/4/2023    Sig - Route: Place 1 suppository (25 mg total) rectally 2 (two) times daily for 14 days. - Rectal    Sent to pharmacy as: Hydrocortisone Acetate 25 MG Rectal Suppository (Anusol-HC)      Quantity and directions are not making sense.

## 2023-06-21 RX ORDER — HYDROCORTISONE 25 MG/G
1 CREAM TOPICAL 2 TIMES DAILY PRN
Qty: 14 EACH | Refills: 1 | Status: SHIPPED | OUTPATIENT
Start: 2023-06-21

## 2023-08-24 ENCOUNTER — TELEPHONE (OUTPATIENT)
Dept: INTERNAL MEDICINE CLINIC | Facility: CLINIC | Age: 73
End: 2023-08-24

## 2023-10-26 ENCOUNTER — TELEPHONE (OUTPATIENT)
Dept: INTERNAL MEDICINE CLINIC | Facility: CLINIC | Age: 73
End: 2023-10-26

## 2023-10-26 DIAGNOSIS — L98.9 SKIN LESION: Primary | ICD-10-CM

## 2023-10-26 NOTE — TELEPHONE ENCOUNTER
Pt has appt on Mon at 2:10 to see Dr. Mirian Castillo (dermatologist) for a follow-up. His current referral  in Sept. He is asking for new referral to be placed asap.

## 2024-01-06 ENCOUNTER — ORDER TRANSCRIPTION (OUTPATIENT)
Dept: ADMINISTRATIVE | Facility: HOSPITAL | Age: 74
End: 2024-01-06

## 2024-01-06 DIAGNOSIS — Z13.6 SCREENING FOR CARDIOVASCULAR CONDITION: Primary | ICD-10-CM

## 2024-01-10 ENCOUNTER — HOSPITAL ENCOUNTER (OUTPATIENT)
Dept: CT IMAGING | Facility: HOSPITAL | Age: 74
Discharge: HOME | End: 2024-01-10
Attending: INTERNAL MEDICINE

## 2024-01-10 DIAGNOSIS — Z13.6 SCREENING FOR HEART DISEASE: ICD-10-CM

## 2024-01-10 DIAGNOSIS — Z13.6 SCREENING FOR CARDIOVASCULAR CONDITION: ICD-10-CM

## 2024-01-10 LAB
GLUCOSE POC: 94 MG/DL (ref 70–100)
HDL POC: 24 MG/DL (ref 40–60)
LDL POC: 88 MG/DL (ref 0–130)
TC/HDL RATIO: 6 (ref 0–5)
TOTAL CHOLESTEROL POC: 139 MG/DL (ref 0–200)
TRIGLYCERIDES POC: 131 MG/DL (ref 0–200)

## 2024-01-10 NOTE — PROGRESS NOTES
Date of Service 1/10/2024    JAYDON JACK  Date of Birth 10/15/1950    Patient Age: 73 year old    PCP: Jhon Paula MD  [address]  [facility] [address]  [facility] [address]    Heart Scan Consult  Preliminary Heart Scan Score: 0    Previous Screening  Heart Scan Completed Previously: No        Peripheral Vascular Scan Completed Previously: No          Risk Factors  Personal Risk Factors  Non-alterable Risk Factors: Family History (Both parents had Heart Attacks in their 70s)  Alterable Risk Factors: Lack of exercise (Encouraged to add cardio to his routine that includes yoga)      Body Mass Index  There is no height or weight on file to calculate BMI.    Blood Pressure  There were no vitals taken for this visit.  /78  (Normal =< 120/80,  Elevated = 120-129/ >80,  High Stage1 130-139/80-89 , Stage2 >140/>90)    Lipid Profile  Patient was in fasting state: Yes    Cholesterol: 139, done on 1/10/2024.  HDL Cholesterol: 24, done on 1/10/2024.  LDL Cholesterol: 88, done on 1/10/2024.  TriGlycerides 131, done on 1/10/2024.    Cholesterol Goals  Value   Total  =< 200   HDL  = > 45 Men = > 55 Women   LDL   =< 100   Triglycerides  =< 150       Glucose and Hemoglobin A1C  Lab Results   Component Value Date    GLU 94 01/10/2024    A1C 5.5 08/24/2022     (Normal Fasting Glucose < 100mg/dl )    Nurse Review  Risk factor information and results reviewed with Nurse: Yes    Recommended Follow Up:  Consult your physician regarding:: Final Heart Scan Report;Discuss potential for Incidental Finding      Recommendations for Change:  Nutrition Changes: No Change Needed    Cholesterol Modification (goal of therapy depends upon your risk): Increase HDL (Healthy/Good) Normal >45 Men >55 Women    Exercise: Initiate Program (Add in cardio exercise)         Weight Management: Maintain Current Weight         Repeat Heart Scan: 5 years if Calcium Score is 0.0;Discuss with your Physician              [place] Recommended Resources:                Sarah MCCLAIN RN        Please Contact the Nurse Heart Line with any Questions or Concerns [phone].

## 2024-03-11 ENCOUNTER — TELEPHONE (OUTPATIENT)
Dept: INTERNAL MEDICINE CLINIC | Facility: CLINIC | Age: 74
End: 2024-03-11

## 2024-03-11 DIAGNOSIS — E78.2 MIXED HYPERLIPIDEMIA: ICD-10-CM

## 2024-03-11 DIAGNOSIS — Z00.00 ROUTINE GENERAL MEDICAL EXAMINATION AT A HEALTH CARE FACILITY: Primary | ICD-10-CM

## 2024-03-11 DIAGNOSIS — Z12.5 SCREENING FOR MALIGNANT NEOPLASM OF PROSTATE: ICD-10-CM

## 2024-04-18 ENCOUNTER — TELEPHONE (OUTPATIENT)
Dept: INTERNAL MEDICINE CLINIC | Facility: CLINIC | Age: 74
End: 2024-04-18

## 2024-04-18 NOTE — TELEPHONE ENCOUNTER
Informed must fast no call back required. Orders to [facility].  Pt will have labs done tomorrow.    Future Appointments   Date Time Provider Department Center   4/22/2024  5:20 PM Jhon Paula MD [facility]

## 2024-04-21 LAB
ABSOLUTE BASOPHILS: 30 CELLS/UL (ref 0–200)
ABSOLUTE EOSINOPHILS: 222 CELLS/UL (ref 15–500)
ABSOLUTE LYMPHOCYTES: 1704 CELLS/UL (ref 850–3900)
ABSOLUTE MONOCYTES: 726 CELLS/UL (ref 200–950)
ABSOLUTE NEUTROPHILS: 3318 CELLS/UL (ref 1500–7800)
ALBUMIN/GLOBULIN RATIO: 1.4 (CALC) (ref 1–2.5)
ALBUMIN: 4.1 G/DL (ref 3.6–5.1)
ALKALINE PHOSPHATASE: 123 U/L (ref 35–144)
ALT: 33 U/L (ref 9–46)
AST: 26 U/L (ref 10–35)
BASOPHILS: 0.5 %
BILIRUBIN, TOTAL: 0.4 MG/DL (ref 0.2–1.2)
BUN: 13 MG/DL (ref 7–25)
CALCIUM: 9 MG/DL (ref 8.6–10.3)
CARBON DIOXIDE: 28 MMOL/L (ref 20–32)
CHLORIDE: 103 MMOL/L (ref 98–110)
CHOL/HDLC RATIO: 3.9 (CALC)
CHOLESTEROL, TOTAL: 173 MG/DL
CREATININE: 1.23 MG/DL (ref 0.7–1.28)
EGFR: 62 ML/MIN/1.73M2
EOSINOPHILS: 3.7 %
GLOBULIN: 3 G/DL (CALC) (ref 1.9–3.7)
GLUCOSE: 91 MG/DL (ref 65–99)
HDL CHOLESTEROL: 44 MG/DL
HEMATOCRIT: 47.1 % (ref 38.5–50)
HEMOGLOBIN: 15.4 G/DL (ref 13.2–17.1)
LDL-CHOLESTEROL: 108 MG/DL (CALC)
LYMPHOCYTES: 28.4 %
MCH: 30 PG (ref 27–33)
MCHC: 32.7 G/DL (ref 32–36)
MCV: 91.8 FL (ref 80–100)
MONOCYTES: 12.1 %
MPV: 11.1 FL (ref 7.5–12.5)
NEUTROPHILS: 55.3 %
NON-HDL CHOLESTEROL: 129 MG/DL (CALC)
PLATELET COUNT: 193 THOUSAND/UL (ref 140–400)
POTASSIUM: 4.4 MMOL/L (ref 3.5–5.3)
PROTEIN, TOTAL: 7.1 G/DL (ref 6.1–8.1)
PSA, TOTAL: 0.72 NG/ML
RDW: 12.4 % (ref 11–15)
RED BLOOD CELL COUNT: 5.13 MILLION/UL (ref 4.2–5.8)
SODIUM: 137 MMOL/L (ref 135–146)
T4, FREE: 0.8 NG/DL (ref 0.8–1.8)
TRIGLYCERIDES: 114 MG/DL
TSH W/REFLEX TO FT4: 6.45 MIU/L (ref 0.4–4.5)
WHITE BLOOD CELL COUNT: 6 THOUSAND/UL (ref 3.8–10.8)

## 2024-04-22 ENCOUNTER — OFFICE VISIT (OUTPATIENT)
Dept: INTERNAL MEDICINE CLINIC | Facility: CLINIC | Age: 74
End: 2024-04-22
Payer: MEDICARE

## 2024-04-22 VITALS
RESPIRATION RATE: 16 BRPM | DIASTOLIC BLOOD PRESSURE: 78 MMHG | HEART RATE: 74 BPM | BODY MASS INDEX: 23.66 KG/M2 | WEIGHT: 142 LBS | HEIGHT: 64.96 IN | SYSTOLIC BLOOD PRESSURE: 138 MMHG | TEMPERATURE: 98 F | OXYGEN SATURATION: 99 %

## 2024-04-22 DIAGNOSIS — E53.8 VITAMIN B12 DEFICIENCY: ICD-10-CM

## 2024-04-22 DIAGNOSIS — M87.052 AVASCULAR NECROSIS OF FEMORAL HEAD, LEFT (HCC): ICD-10-CM

## 2024-04-22 DIAGNOSIS — E03.8 SUBCLINICAL HYPOTHYROIDISM: ICD-10-CM

## 2024-04-22 DIAGNOSIS — Z00.00 ENCOUNTER FOR ANNUAL HEALTH EXAMINATION: Primary | ICD-10-CM

## 2024-04-22 DIAGNOSIS — M17.12 PRIMARY OSTEOARTHRITIS OF LEFT KNEE: ICD-10-CM

## 2024-04-22 DIAGNOSIS — M75.82 ROTATOR CUFF TENDONITIS, LEFT: ICD-10-CM

## 2024-04-22 DIAGNOSIS — E55.9 VITAMIN D DEFICIENCY: ICD-10-CM

## 2024-04-22 DIAGNOSIS — M16.12 PRIMARY OSTEOARTHRITIS OF LEFT HIP: ICD-10-CM

## 2024-04-22 DIAGNOSIS — L29.0 ANAL ITCHING: ICD-10-CM

## 2024-04-23 NOTE — PATIENT INSTRUCTIONS
Rommel Pierson's SCREENING SCHEDULE   Tests on this list are recommended by your physician but may not be covered, or covered at this frequency, by your insurer.   Please check with your insurance carrier before scheduling to verify coverage.   PREVENTATIVE SERVICES FREQUENCY &  COVERAGE DETAILS LAST COMPLETION DATE   Diabetes Screening    Fasting Blood Sugar / Glucose    One screening every 12 months if never tested or if previously tested but not diagnosed with pre-diabetes   One screening every 6 months if diagnosed with pre-diabetes Lab Results   Component Value Date    GLU 91 04/20/2024        Cardiovascular Disease Screening    Lipid Panel  Cholesterol  Lipoprotein (HDL)  Triglycerides Covered every 5 years for all Medicare beneficiaries without apparent signs or symptoms of cardiovascular disease Lab Results   Component Value Date    CHOLEST 173 04/20/2024    HDL 44 04/20/2024     (H) 04/20/2024    TRIG 114 04/20/2024         Electrocardiogram (EKG)   Covered if needed at Welcome to Medicare, and non-screening if indicated for medical reasons -      Ultrasound Screening for Abdominal Aortic Aneurysm (AAA) Covered once in a lifetime for one of the following risk factors   • Men who are 65-75 years old and have ever smoked   • Anyone with a family history -     Colorectal Cancer Screening  Covered for ages 50-85; only need ONE of the following:    Colonoscopy   Covered every 10 years    Covered every 2 years if patient is at high risk or previous colonoscopy was abnormal 06/05/2023    Health Maintenance   Topic Date Due   • Colorectal Cancer Screening  06/05/2028       Flexible Sigmoidoscopy   Covered every 4 years -    Fecal Occult Blood Test Covered annually -   Prostate Cancer Screening    Prostate-Specific Antigen (PSA) Annually Lab Results   Component Value Date    PSA 1.080 10/28/2017     Health Maintenance   Topic Date Due   • PSA  04/20/2026      Immunizations    Influenza Covered once per flu  season  Please get every year 11/02/2023  No recommendations at this time    Pneumococcal Each vaccine ([id number] & [id number]) covered once after 65 Prevnar 13: 06/26/2019    [id number]: 01/24/2017     No recommendations at this time    Hepatitis B One screening covered for patients with certain risk factors   -  No recommendations at this time    Tetanus Toxoid Not covered by Medicare Part B unless medically necessary (cut with metal); may be covered with your pharmacy prescription benefits 08/08/2018    Tetanus, Diptheria and Pertusis TD and TDaP Not covered by Medicare Part B -  No recommendations at this time    Zoster Not covered by Medicare Part B; may be covered with your pharmacy  prescription benefits 10/03/2015  No recommendations at this time

## 2024-04-23 NOTE — PROGRESS NOTES
Subjective:   Rommel Pierson is a 73 year old male who presents for a Medicare Subsequent Annual Wellness visit (Pt already had Initial Annual Wellness) and scheduled follow up of multiple significant but stable problems.     The patient continues to experience a left hip and knee pain, attributed to avascular necrosis of the left femoral head, and primary OA of the left hip and left knee. His symptoms have been impacting his ADLs and recreational activities. Symptoms worsen with weight bearing and ambulation, and improve with infrequent NSAID use. He reports a two-month duration of a left shoulder pain and stiffness, the onset of which he believes was following local immunization administration. He reports continued rectal itching and discomfort despite topical therapy and evaluation by the GI and dermatology services.     History/Other:   Fall Risk Assessment:   He has been screened for Falls and is High Risk. Fall Prevention information provided to patient in After Visit Summary.    Do you feel unsteady when standing or walking?: Yes  Do you worry about falling?: Yes  Have you fallen in the past year?: Yes  How many times have you fallen?: (P) 1  Were you injured?: (P) No     Cognitive Assessment:   He had a completely normal cognitive assessment - see flowsheet entries     Functional Ability/Status:   Rommel Pierson has some abnormal functions as listed below:  He has Meal Preparation difficulties based on screening of functional status.He has Walking problems based on screening of functional status. He has problems with Daily Activities based on screening of functional status.       Depression Screening (PHQ-2/PHQ-9): PHQ-2 SCORE: 0  , done 4/22/2024   Last [place] Suicide Screening on 4/22/2024 was No Risk.     5 minutes spent screening and counseling for depression    Advanced Directives:   He does NOT have a Living Will. [Do you have a living will?: No]  He does NOT have a Power of  for Health Care.  [Do you have a healthcare power of ?: No]  Discussed Advance Care Planning with patient (and family/surrogate if present). Standard forms made available to patient in After Visit Summary.      Patient Active Problem List   Diagnosis    Avascular necrosis of femoral head, left (HCC)    arthroscopy acromioplasty with rotator cuff repair  Global 5/1/2017    Venous stasis dermatitis of both lower extremities    Primary osteoarthritis of left knee    Prediabetes    Primary osteoarthritis of left hip     Allergies:  He has No Known Allergies.    Current Medications:  Outpatient Medications Marked as Taking for the 4/22/24 encounter (Office Visit) with Jhon Paula MD   Medication Sig    hydrocortisone (ANUSOL-HC) 2.5 % External Cream Place 1 Application rectally 2 (two) times daily as needed for Hemorrhoids.    clotrimazole-betamethasone 1-0.05 % External Cream Apply 1 Application topically 2 (two) times daily as needed.    Misc Natural Products (GLUCOSAMINE CHOND CMP ADVANCED OR) Take by mouth.    Homeopathic Products (ZINC COLD THERAPY OR) Take by mouth.    Multiple Vitamin (MULTI-VITAMIN) Oral Tab Take 1 tablet by mouth daily.       Medical History:  He  has a past medical history of Abdominal hernia, Arthritis (January 2012), Bloating, Frequent urination, Hemorrhoids, Irregular bowel habits, Pain in joints, Pain with bowel movements, and Wears glasses.  Surgical History:  He  has a past surgical history that includes cataract (Bilateral) and colonoscopy.   Family History:  His family history is not on file.  Social History:  He  reports that he has never smoked. He has never used smokeless tobacco. He reports that he does not drink alcohol and does not use drugs.    Tobacco:  He has never smoked tobacco.    CAGE Alcohol Screen:   CAGE screening score of 0 on 4/21/2024, showing low risk of alcohol abuse.      Patient Care Team:  Jhon Paula MD as PCP - General (Internal Medicine)  Parris Fink, PT as  Physical Therapist    Review of Systems  Constitutional: negative  Eyes: negative  Ears, nose, mouth, throat, and face: negative  Respiratory: negative  Cardiovascular: negative  Gastrointestinal: negative  Genitourinary:negative  Integument/breast: negative  Hematologic/lymphatic: negative  Musculoskeletal:positive for arthralgias and stiff joints  Neurological: negative  Behavioral/Psych: negative  Endocrine: negative  Allergic/Immunologic: negative    Objective:   Physical Exam  General Appearance:  Alert, cooperative, no distress, appears stated age   Head:  Normocephalic, without obvious abnormality, atraumatic   Eyes:  BL conjunctiva WNL   Ears:  TM WNL BL   Nose: Deferred   Throat: Deferred   Neck: Supple, symmetrical, trachea midline, no adenopathy, thyroid: not enlarged, symmetric, no tenderness/mass/nodules, no carotid bruit or JVD   Back:   Symmetric, no curvature, ROM normal, no CVA tenderness   Lungs:   Clear to auscultation bilaterally, respirations unlabored   Chest Wall:  No tenderness or deformity   Heart:  Regular rate and rhythm, S1, S2 normal, no murmur, rub or gallop   Abdomen:   Soft, non-tender, bowel sounds active all four quadrants,  no masses, no organomegaly. Ventral hernia.   Genitalia: Deferred   Rectal: Deferred   Extremities: No edema   Pulses: 2+ and symmetric   Skin: Skin color, texture, turgor normal, no rashes or lesions   Lymph nodes: Cervical nodes normal   Neurologic: Grossly normal     /82 (BP Location: Left arm, Patient Position: Sitting, Cuff Size: adult)   Pulse 74   Temp 98 °F (36.7 °C) (Temporal)   Resp 16   Ht 5' 4.96\" (1.65 m)   Wt 142 lb (64.4 kg)   SpO2 99%   BMI 23.66 kg/m²  Estimated body mass index is 23.66 kg/m² as calculated from the following:    Height as of this encounter: 5' 4.96\" (1.65 m).    Weight as of this encounter: 142 lb (64.4 kg).    Medicare Hearing Assessment:   Hearing Screening    Time taken: 4/22/2024  5:33 PM  Entry User:  Barbara Drew MA  Screening Method: Finger Rub  Finger Rub Result: Pass         Visual Acuity:   Right Eye Visual Acuity: Corrected Right Eye Chart Acuity: 20/30   Left Eye Visual Acuity: Corrected Left Eye Chart Acuity: 20/30   Both Eyes Visual Acuity: Corrected Both Eyes Chart Acuity: 20/30   Able To Tolerate Visual Acuity: Yes        Assessment & Plan:   Rommel Pierson is a 73 year old male who presents for a Medicare Assessment.     Outstanding screening and preventive measures:  None    Anal itching:  No hemorrhoid  No rash  Suboptimal response to topical steroid therapy  Referred to alternate GI service per request    Left rotator cuff tendonitis:  Literature of exercises provided  Physical therapy ordered  Xray ordered    Chronic left hip pain:  Secondary or OA of left hip and avascular necrosis of left femoral head  Referred to ortho service    Chronic left knee pain:  Secondary to primary OA  Referred to ortho service    Vitamin D and B12 deficiencies:  Levels ordered    Subclinical hypothyroidism:  No significant symptoms  Repeat study in ~6w to 3mo    1. Anal itching (Primary)  -     Gastro Referral - In Network  2. Rotator cuff tendonitis, left  -     Physical Therapy Referral - [facility]  -     XR SHOULDER, COMPLETE (MIN 2 VIEWS), LEFT (CPT=73030); Future; Expected date: 04/22/2024  3. Primary osteoarthritis of left knee  -     Ortho Referral - In Network  4. Avascular necrosis of femoral head, left (HCC)  -     Ortho Referral - In Network  5. Primary osteoarthritis of left hip  -     Ortho Referral - In Network  6. Vitamin D deficiency  -     Vitamin D, 25-Hydroxy  7. Vitamin B12 deficiency  -     Vitamin B12  8. Encounter for annual health examination    The patient indicates understanding of these issues and agrees to the plan.  Reinforced healthy diet, lifestyle, and exercise.      Return in 6 months (on 10/22/2024).     Jhon Paula MD, 4/22/2024     Supplementary Documentation:    General Health:  In the past six months, have you lost more than 10 pounds without trying?: 2 - No  Has your appetite been poor?: No  Type of Diet: Vegetarian  How does the patient maintain a good energy level?: Stretching  How would you describe your daily physical activity?: Moderate  How would you describe your current health state?: Fair  How do you maintain positive mental well-being?: Social Interaction  On a scale of 0 to 10, with 0 being no pain and 10 being severe pain, what is your pain level?: 5 - (Moderate)  In the past six months, have you experienced urine leakage?: 0-No  At any time do you feel concerned for the safety/well-being of yourself and/or your children, in your home or elsewhere?: No  Have you had any immunizations at another office such as Influenza, Hepatitis B, Tetanus, or Pneumococcal?: Yes        Rommel Pierson's SCREENING SCHEDULE   Tests on this list are recommended by your physician but may not be covered, or covered at this frequency, by your insurer.   Please check with your insurance carrier before scheduling to verify coverage.   PREVENTATIVE SERVICES FREQUENCY &  COVERAGE DETAILS LAST COMPLETION DATE   Diabetes Screening    Fasting Blood Sugar / Glucose    One screening every 12 months if never tested or if previously tested but not diagnosed with pre-diabetes   One screening every 6 months if diagnosed with pre-diabetes Lab Results   Component Value Date    GLU 91 04/20/2024        Cardiovascular Disease Screening    Lipid Panel  Cholesterol  Lipoprotein (HDL)  Triglycerides Covered every 5 years for all Medicare beneficiaries without apparent signs or symptoms of cardiovascular disease Lab Results   Component Value Date    CHOLEST 173 04/20/2024    HDL 44 04/20/2024     (H) 04/20/2024    TRIG 114 04/20/2024         Electrocardiogram (EKG)   Covered if needed at [facility] to Medicare, and non-screening if indicated for medical reasons -      Ultrasound Screening for  Abdominal Aortic Aneurysm (AAA) Covered once in a lifetime for one of the following risk factors    Men who are 65-75 years old and have ever smoked    Anyone with a family history -     Colorectal Cancer Screening  Covered for ages 50-85; only need ONE of the following:    Colonoscopy   Covered every 10 years    Covered every 2 years if patient is at high risk or previous colonoscopy was abnormal 06/05/2023    Health Maintenance   Topic Date Due    Colorectal Cancer Screening  06/05/2028       Flexible Sigmoidoscopy   Covered every 4 years -    Fecal Occult Blood Test Covered annually -   Prostate Cancer Screening    Prostate-Specific Antigen (PSA) Annually Lab Results   Component Value Date    PSA 1.080 10/28/2017     Health Maintenance   Topic Date Due    PSA  04/20/2026      Immunizations    Influenza Covered once per flu season  Please get every year 11/02/2023  No recommendations at this time    Pneumococcal Each vaccine ([id number] & [id number]) covered once after 65 Prevnar 13: 06/26/2019    [id number]: 01/24/2017     No recommendations at this time    Hepatitis B One screening covered for patients with certain risk factors   -  No recommendations at this time    Tetanus Toxoid Not covered by Medicare Part B unless medically necessary (cut with metal); may be covered with your pharmacy prescription benefits 08/08/2018    Tetanus, Diptheria and Pertusis TD and TDaP Not covered by Medicare Part B -  No recommendations at this time    Zoster Not covered by Medicare Part B; may be covered with your pharmacy  prescription benefits 10/03/2015  No recommendations at this time

## 2024-04-24 ENCOUNTER — TELEPHONE (OUTPATIENT)
Dept: INTERNAL MEDICINE CLINIC | Facility: CLINIC | Age: 74
End: 2024-04-24

## 2024-04-24 NOTE — TELEPHONE ENCOUNTER
Pt stated that he saw AD on 4/22/24 and AD issued him a form for a temporary parking placard however the pt is stating that he needs a permanent one.

## 2024-05-13 ENCOUNTER — HOSPITAL ENCOUNTER (OUTPATIENT)
Dept: GENERAL RADIOLOGY | Facility: HOSPITAL | Age: 74
Discharge: HOME | End: 2024-05-13
Attending: INTERNAL MEDICINE

## 2024-05-13 DIAGNOSIS — M75.82 ROTATOR CUFF TENDONITIS, LEFT: ICD-10-CM

## 2024-05-13 PROCEDURE — 73030 X-RAY EXAM OF SHOULDER: CPT | Performed by: INTERNAL MEDICINE

## 2024-05-30 ENCOUNTER — PATIENT MESSAGE (OUTPATIENT)
Dept: INTERNAL MEDICINE CLINIC | Facility: CLINIC | Age: 74
End: 2024-05-30

## 2024-05-30 DIAGNOSIS — L98.9 SKIN LESION: Primary | ICD-10-CM

## 2024-05-31 NOTE — TELEPHONE ENCOUNTER
From: Rommel Pierson  To: Jhon Paula  Sent: 2024 11:33 AM CDT  Subject: Request for referral for Dermatologist     Hi,  I have an appointment on , with my dermatologist Dr Rock Terry of [facility] [place]. Please send a referral to their office as my previous referral  in April. Please make it as early as possible.   Thanking you.

## 2024-06-24 ENCOUNTER — TELEPHONE (OUTPATIENT)
Dept: PHYSICAL THERAPY | Facility: HOSPITAL | Age: 74
End: 2024-06-24

## 2024-06-25 ENCOUNTER — OFFICE VISIT (OUTPATIENT)
Dept: PHYSICAL THERAPY | Age: 74
End: 2024-06-25
Attending: INTERNAL MEDICINE

## 2024-06-25 DIAGNOSIS — M75.82 ROTATOR CUFF TENDONITIS, LEFT: Primary | ICD-10-CM

## 2024-06-25 PROCEDURE — 97161 PT EVAL LOW COMPLEX 20 MIN: CPT

## 2024-06-25 NOTE — PROGRESS NOTES
SHOULDER EVALUATION:     Diagnosis:   Rotator cuff tendonitis, left (M75.82)       Referring Provider: Jhon Paula  Date of Evaluation:    6/25/2024    Precautions:  None Next MD visit:   none scheduled  Date of Surgery: n/a     PATIENT SUMMARY   Rommel Pierson is a 73 year old male who presents to therapy today with complaints of left lateral and posterior shoulder pain that comes and goes. The pain began about 4 months ago. No reports of trauma prior to onset of pain. Lifting heavy objects greater than 8 lbs causes pain. No reports of issues with driving or sleeping due to the left shoulder.  Patient is R hand dominant. Patient had right rotator cuff repair in 2017.   Pt describes pain level current 0/10, at best 0/10, at worst 5/10. Pain described as achy   Current functional limitations include lifting, reaching the top shelf with left arm .   Severity: moderate  Irritability (5-10 min): low-moderate  Nature of symptoms: mechanical/inflammatory  Stage: chronic  Stability: high    24 hour pattern: more pain in the morning   Aggravating factors: lifting, reaching overhead  Relieving factors: rest    P's: P1 L posterior shoulder pain  P2 L lateral shoulder pain  Rommel describes prior level of function fairly active with household tasks. Pt goals include return to normal function.  Past medical history was reviewed with Rommel. Significant findings include   Past Medical History:    Abdominal hernia    Arthritis    Bloating    Frequent urination    Hemorrhoids    Irregular bowel habits    Pain in joints    Pain with bowel movements    Wears glasses     No reports of previous left shoulder or neck injuries.     ASSESSMENT  Rommel presents to physical therapy evaluation with primary c/o left lateral and posterior shoulder pain. The results of the objective tests and measures show decreased AROM of the left shoulder, impaired strength, positive impingement special tests for the rotator cuff, and tenderness with  palpation of the left supraspinatus and infraspinatus.  Functional deficits include but are not limited to lifting more than 8 pounds, reaching overhead.  Signs and symptoms are consistent with diagnosis of tendonitis of the supraspinatus and infraspinatus due to subacromial impingement. Pt and PT discussed evaluation findings, pathology, POC and HEP.  Pt voiced understanding and performs HEP correctly without reported pain. Skilled Physical Therapy is medically necessary to address the above impairments and reach functional goals.     OBJECTIVE:   Observation/Posture: upper crossed posture  Palpation: tenderness and pain with palpation to supraspinatus and infraspinatus on the left. No symptoms with palpation of subscapularis  Sensation: intact bilateral upper extremity light touch  Cervical Screen: no reproduction of symptoms with overpressures or quadrants    AROM/PROM: (* denotes performed with pain)  Shoulder  Elbow   Flexion: R 150/180; L 155/180  Abduction: R 180; L 180   ER: R 82; L 80  IR: R 75; L 70 Flexion: R WFL; L WFL  Extension: R WFL; L WFL  Supination: R WFL, L WFL  Pronation: R WFL, L WFL     Accessory motion: hypomobile and pain with posterior and inferior mobs on the left arm.       Strength/MMT: (* denotes performed with pain)  Shoulder Elbow Scapular   Flexion: R 3+/5; L 3+*P1 P2/5  Abduction: R 3+/5; L 3+P1 P2/5  ER: R 3+/5; L 3+*P1 P2/5  IR: R 3+/5; L 3+* P1 P2/5 Flexion: R 4/5; L 4/5  Extension: R 4/5; L 4/5  Supination: R 4/5; L 4/5  Pronation: R 4/5; L 4/5  Rhomboids: R 3+/5, L 3+*P1/5  Mid trap: R 3+/5; L 3+* P1/5   Lats: R 3+/5, L 3+* P1/5  Low trap: R 3+/5; L 3+* P1/5     Special tests:   Labrum Special Testing:   Compression Rotation Test: R -, L -  Biceps Load II: R -, L -        Subacromial Pain Syndrome:  Empty Can test: R -, L -  Painful Arc Sign: R -, L +  External Rotation Resistance: R -, L +  Bhagat-Shad Impingement Sign: R -, L +  Neer Impingement Test: R -, L +  Palpable  Tenderness Infraspinatus and Supraspinatus: R -, L +  Bear Hug: R - L +   Lift off: R - L +     Long Head Biceps Tendinopathy:   Speed Test: R -, L -    ULTT: base test completed. No reproduction of symptoms       Today’s Treatment and Response:   Pt education was provided on exam findings, treatment diagnosis, treatment plan, expectations, and prognosis. Pt was also provided recommendations for activity modifications, possible soreness after evaluation, modalities as needed [ice/heat], and importance of remaining active.  Patient was instructed in and issued a HEP for: Access Code: [email or web address]  URL: [email or web address]  Date: 06/25/2024  Prepared by: [facility] King    Program Notes  Rommel Pierson    Exercises  - Seated Scapular Retraction  - 2 x daily - 7 x weekly - 2 sets - 10 reps - 3 hold  - Standing Shoulder Posterior Capsule Stretch  - 2 x daily - 7 x weekly - 3 sets - 25 hold  - Supine Cross Body Shoulder Stretch  - 2 x daily - 7 x weekly - 3 sets - 25 hold  - Sleeper Stretch  - 3 x daily - 7 x weekly - 3 sets - 25 hold    Charges: PT Eval Low Complexity,          Total Treatment Time: 45 min     Based on clinical rationale and outcome measures, this evaluation involved Low Complexity decision making due to 1-2 personal factors/comorbidities, 3 body structures involved/activity limitations, and evolving symptoms including changing pain levels.  PLAN OF CARE:    Goals: (to be met in 10 visits)   Pt will report improved ability to sleep without waking due to shoulder pain   Pt will improve shoulder flexion AROM to >170 degrees to be able to reach into overhead cabinets without pain or restriction   Pt will increase shoulder AROM ER to 85 to be able to reach and fasten seatbelt   Pt will increase shoulder AROM IR to 70 to be able to reach in back pocket, tuck in shirt, and turn steering wheel without pain  Pt will improve shoulder strength throughout to 4/5 to improve function with lifting  objects greater than 8 pounds    Pt will demonstrate increased mid/low trap strength to 4/5 to promote improved shoulder mechanics and stabilization with lifting and reaching   Pt will be independent and compliant with comprehensive HEP to maintain progress achieved in PT       Frequency / Duration: Patient will be seen for 2 x/week or a total of 10 visits over a 90 day period. Treatment will include: Manual Therapy, Neuromuscular Re-education, Therapeutic Exercise, and Home Exercise Program instruction    Education or treatment limitation: None  Rehab Potential:good    QuickDASH Outcome Score  Score: 25 % (6/24/2024 10:03 PM)      Patient/Family/Caregiver was advised of these findings, precautions, and treatment options and has agreed to actively participate in planning and for this course of care.    Thank you for your referral. Please co-sign or sign and return this letter via fax as soon as possible to [phone]. If you have any questions, please contact me at Dept: [phone]    Sincerely,  Electronically signed by therapist: [facility] King, PT  Physician's certification required: Yes  I certify the need for these services furnished under this plan of treatment and while under my care.    X___________________________________________________ Date____________________    Certification From: 6/25/2024  To:9/23/2024

## 2024-07-02 ENCOUNTER — APPOINTMENT (OUTPATIENT)
Dept: PHYSICAL THERAPY | Age: 74
End: 2024-07-02
Attending: INTERNAL MEDICINE
Payer: MEDICARE

## 2024-07-03 ENCOUNTER — OFFICE VISIT (OUTPATIENT)
Dept: PHYSICAL THERAPY | Age: 74
End: 2024-07-03
Attending: INTERNAL MEDICINE
Payer: MEDICARE

## 2024-07-03 PROCEDURE — 97110 THERAPEUTIC EXERCISES: CPT

## 2024-07-03 PROCEDURE — 97140 MANUAL THERAPY 1/> REGIONS: CPT

## 2024-07-03 NOTE — PROGRESS NOTES
Diagnosis:   Rotator cuff tendonitis, left (M75.82)          Referring Provider: Jhon Paula  Date of Evaluation:    6/25/2024    Precautions:  None Next MD visit:   none scheduled  Date of Surgery: n/a   Insurance Primary/Secondary: HU[employer]NA MCR / HUMANA     # Auth Visits: 8 visits            Subjective: the shoulder feels the same. The exercises are going well at home.    Pain: 0/10 left shoulder pain       Objective:   Comparable Sign: Left Shoulder Flexion  Start of session: 150 deg    End of session: 158 deg    PROM: 180 deg         Assessment: patient tolerated session well. Patient presented to session with no complaints of left shoulder pain. Patient required moderate verbal and tactile cues with exercises from current HEP. Progressed scapular strengthening and rotator cuff strengthening. No reports of pain with completion of exercises. Patient's HEP updated.       Goals: (to be met in 10 visits)   Pt will report improved ability to sleep without waking due to shoulder pain   Pt will improve shoulder flexion AROM to >170 degrees to be able to reach into overhead cabinets without pain or restriction   Pt will increase shoulder AROM ER to 85 to be able to reach and fasten seatbelt   Pt will increase shoulder AROM IR to 70 to be able to reach in back pocket, tuck in shirt, and turn steering wheel without pain  Pt will improve shoulder strength throughout to 4/5 to improve function with lifting objects greater than 8 pounds    Pt will demonstrate increased mid/low trap strength to 4/5 to promote improved shoulder mechanics and stabilization with lifting and reaching   Pt will be independent and compliant with comprehensive HEP to maintain progress achieved in PT     Plan: continue with strengthening, shoulder mobs  Date: 7/3/2024  TX#: 2/8 Date:                 TX#: 3/ Date:                 TX#: 4/ Date:                 TX#: 5/ Date:   Tx#: 6/   TherEx - 30 min  Arm Bike level 1, 2 min forward, 2 min backwards    Seated scapular retractions 2 x 10  Supine posterior capsule stretch left 3 x 25 sec hold  Sleeper Stretch left 3 x 25 sec hold  Banded Rows GTB x 15, BTB x 15  Resisted scapular retractions BTB 2 x 10       Manual Therapy - 15 min   PA of left shoulder grade III-IV mobs  Inferior mobs of left shoulder grade III-IV  PROM IR, ER, flexion & abduction on left shoulder                     HEP: Access Code: [email or web address]  URL: [email or web address]  Date: 07/03/2024  Prepared by: [facility] King    Program Notes  Rommel Kenna    Exercises  - Seated Scapular Retraction  - 2 x daily - 7 x weekly - 2 sets - 10 reps - 3 hold  - Standing Shoulder Posterior Capsule Stretch  - 2 x daily - 7 x weekly - 3 sets - 25 hold  - Supine Cross Body Shoulder Stretch  - 2 x daily - 7 x weekly - 3 sets - 25 hold  - Sleeper Stretch  - 3 x daily - 7 x weekly - 3 sets - 25 hold  - Shoulder External Rotation with Anchored Resistance  - 2 x daily - 7 x weekly - 2 sets - 10 reps  - Shoulder Internal Rotation with Resistance  - 2 x daily - 7 x weekly - 2 sets - 10 reps  - Standing Shoulder Row with Anchored Resistance  - 2 x daily - 7 x weekly - 2 sets - 15 reps  - Scapular Retraction with Resistance Advanced  - 2 x daily - 7 x weekly - 2 sets - 15 reps  - Standing Shoulder Internal Rotation Stretch with Towel  - 2 x daily - 7 x weekly - 2 sets - 10 reps - 5 hold    Charges: 1 MT 2 TherEx       Total Timed Treatment: 45 min  Total Treatment Time: 45 min

## 2024-07-08 ENCOUNTER — TELEPHONE (OUTPATIENT)
Dept: PHYSICAL THERAPY | Facility: HOSPITAL | Age: 74
End: 2024-07-08

## 2024-07-08 ENCOUNTER — OFFICE VISIT (OUTPATIENT)
Dept: PHYSICAL THERAPY | Age: 74
End: 2024-07-08
Attending: INTERNAL MEDICINE
Payer: MEDICARE

## 2024-07-08 PROCEDURE — 97140 MANUAL THERAPY 1/> REGIONS: CPT

## 2024-07-08 PROCEDURE — 97110 THERAPEUTIC EXERCISES: CPT

## 2024-07-08 NOTE — PROGRESS NOTES
Diagnosis:   Rotator cuff tendonitis, left (M75.82)          Referring Provider: Jhon Paula  Date of Evaluation:    6/25/2024    Precautions:  None Next MD visit:   none scheduled  Date of Surgery: n/a   Insurance Primary/Secondary: HUMANA ODELL / HUMANA     # Auth Visits: 8 visits            Subjective: The shoulder is feeling better. I am getting the exercises done 2x/day. I am not having much pain in the shoulder     Pain: 0/10 left shoulder pain       Objective:   Comparable Sign: Left Shoulder Flexion  Start of session: 155 deg    End of session: 160 deg    PROM: 180 deg         Assessment: patient tolerated session well. Patient presented to session with no complaints of left shoulder pain. Patient required moderate verbal and tactile cues with completion of sleeper stretch. Patient demonstrated improvement of left shoulder flexion. Increased shoulder resistance training, and was well tolerated. Patient reports that he has not been completing full HEP, therefore no new exercises added at this time. Re-emphasized importance of completing full HEP       Goals: (to be met in 10 visits)   Pt will report improved ability to sleep without waking due to shoulder pain   Pt will improve shoulder flexion AROM to >170 degrees to be able to reach into overhead cabinets without pain or restriction   Pt will increase shoulder AROM ER to 85 to be able to reach and fasten seatbelt   Pt will increase shoulder AROM IR to 70 to be able to reach in back pocket, tuck in shirt, and turn steering wheel without pain  Pt will improve shoulder strength throughout to 4/5 to improve function with lifting objects greater than 8 pounds    Pt will demonstrate increased mid/low trap strength to 4/5 to promote improved shoulder mechanics and stabilization with lifting and reaching   Pt will be independent and compliant with comprehensive HEP to maintain progress achieved in PT     Plan: continue with strengthening, shoulder mobs  Date:  7/3/2024  TX#: 2/8 Date:7/8/2024                 TX#: 3/8 Date:                 TX#: 4/ Date:                 TX#: 5/ Date:   Tx#: 6/   TherEx - 30 min  Arm Bike level 1, 2 min forward, 2 min backwards   Seated scapular retractions 2 x 10  Supine posterior capsule stretch left 3 x 25 sec hold  Sleeper Stretch left 3 x 25 sec hold  Banded Rows GTB x 15, BTB x 15  Resisted scapular retractions BTB 2 x 10 TherEx - 35 min   Arm Bike level 1.5, 3 min forward, 3 min backwards   Supine posterior capsule stretch left 4 x 20 sec holds   Sleeper Stretch left 4 x 25 sec hold  Supine serratus punches 2 x 10   Seated shoulder flexion x 15  Banded rows BTB 2 x 15  Resisted scapular retractions BTB 2 x 15  AAROM with dowel shoulder flexion x 15  Standing IR stretch with strap 10 x 5 sec holds   Resisted shoulder flexion GTB x 10 L   Resisted shoulder abduction GTB x 10 L      Manual Therapy - 15 min   PA of left shoulder grade III-IV mobs  Inferior mobs of left shoulder grade III-IV  PROM IR, ER, flexion & abduction on left shoulder Manual Therapy - 10 min   PA of left shoulder grade III-IV mobs  Inferior mobs of left shoulder grade III-IV  PROM flexion & abduction on left shoulder                    HEP: Access Code: [email or web address]  URL: [email or web address]  Date: 07/03/2024  Prepared by: Edward King    Program Notes  Rommeljazz Pierson    Exercises  - Seated Scapular Retraction  - 2 x daily - 7 x weekly - 2 sets - 10 reps - 3 hold  - Standing Shoulder Posterior Capsule Stretch  - 2 x daily - 7 x weekly - 3 sets - 25 hold  - Supine Cross Body Shoulder Stretch  - 2 x daily - 7 x weekly - 3 sets - 25 hold  - Sleeper Stretch  - 3 x daily - 7 x weekly - 3 sets - 25 hold  - Shoulder External Rotation with Anchored Resistance  - 2 x daily - 7 x weekly - 2 sets - 10 reps  - Shoulder Internal Rotation with Resistance  - 2 x daily - 7 x weekly - 2 sets - 10 reps  - Standing Shoulder Row with Anchored Resistance  - 2 x  daily - 7 x weekly - 2 sets - 15 reps  - Scapular Retraction with Resistance Advanced  - 2 x daily - 7 x weekly - 2 sets - 15 reps  - Standing Shoulder Internal Rotation Stretch with Towel  - 2 x daily - 7 x weekly - 2 sets - 10 reps - 5 hold    Charges: 1 MT 2 TherEx       Total Timed Treatment: 45 min  Total Treatment Time: 45 min

## 2024-07-10 ENCOUNTER — OFFICE VISIT (OUTPATIENT)
Dept: PHYSICAL THERAPY | Age: 74
End: 2024-07-10
Attending: INTERNAL MEDICINE
Payer: MEDICARE

## 2024-07-10 ENCOUNTER — APPOINTMENT (OUTPATIENT)
Dept: PHYSICAL THERAPY | Age: 74
End: 2024-07-10
Attending: INTERNAL MEDICINE
Payer: MEDICARE

## 2024-07-10 PROCEDURE — 97140 MANUAL THERAPY 1/> REGIONS: CPT

## 2024-07-10 PROCEDURE — 97110 THERAPEUTIC EXERCISES: CPT

## 2024-07-10 NOTE — PROGRESS NOTES
Diagnosis:   Rotator cuff tendonitis, left (M75.82)          Referring Provider: Jhon Paula  Date of Evaluation:    6/25/2024    Precautions:  None Next MD visit:   none scheduled  Date of Surgery: n/a   Insurance Primary/Secondary: HUMANA MCR / HUMANA     # Auth Visits: 8 visits            Subjective: there is a little bit more pain in the posterior shoulder. The exercises are going well.     Pain: 3-4/10 left shoulder pain       Objective:   Comparable Sign: Left Shoulder Flexion  Start of session: 150 deg    End of session: 160 deg    PROM: 180 deg         Assessment: patient tolerated session well. Patient presented to session with mild complaints of left shoulder pain. Patient demonstrated proper mechanics with the completion of the sleeper stretch. Patient reports that the pain increases with completion of work tasks, which requires his left arm to be in an elevated position for prolonged periods of time. Patient reported decreased symptoms following completion of stretches and MT. Patient HEP progressed to decrease days of strengthening to 3x/week       Goals: (to be met in 10 visits)   Pt will report improved ability to sleep without waking due to shoulder pain MET  Pt will improve shoulder flexion AROM to >170 degrees to be able to reach into overhead cabinets without pain or restriction   Pt will increase shoulder AROM ER to 85 to be able to reach and fasten seatbelt   Pt will increase shoulder AROM IR to 70 to be able to reach in back pocket, tuck in shirt, and turn steering wheel without pain  Pt will improve shoulder strength throughout to 4/5 to improve function with lifting objects greater than 8 pounds    Pt will demonstrate increased mid/low trap strength to 4/5 to promote improved shoulder mechanics and stabilization with lifting and reaching   Pt will be independent and compliant with comprehensive HEP to maintain progress achieved in PT     Plan: continue with AROM, rhythmic stabilization and  strengthening   Date: 7/3/2024  TX#: 2/8 Date:7/8/2024                 TX#: 3/8 Date:7/10/2024                 TX#: 4/8 Date:                 TX#: 5/ Date:   Tx#: 6/   TherEx - 30 min  Arm Bike level 1, 2 min forward, 2 min backwards   Seated scapular retractions 2 x 10  Supine posterior capsule stretch left 3 x 25 sec hold  Sleeper Stretch left 3 x 25 sec hold  Banded Rows GTB x 15, BTB x 15  Resisted scapular retractions BTB 2 x 10 TherEx - 35 min   Arm Bike level 1.5, 3 min forward, 3 min backwards   Supine posterior capsule stretch left 4 x 20 sec holds   Sleeper Stretch left 4 x 25 sec hold  Supine serratus punches 2 x 10   Seated shoulder flexion x 15  Banded rows BTB 2 x 15  Resisted scapular retractions BTB 2 x 15  AAROM with dowel shoulder flexion x 15  Standing IR stretch with strap 10 x 5 sec holds   Resisted shoulder flexion GTB x 10 L   Resisted shoulder abduction GTB x 10 L TherEx - 30 min   Arm bike level 1.5, 3 min forward, 3 min backwards  Supine posterior capsule stretch left 4 x 20 sec holds   Sleeper Stretch left 4 x 25 sec hold  Supine serratus punches 2 x 10   Supine serratus stabilizations 2 x 30 sec  Wall pushups 2 x 10   Banded rows BTB 2 x 15  Resisted scapular retractions BTB 2 x 15  Banded ER with GTB 2 x 10 on L  Banded IR with GTB 2 x 10 on L   Resisted shoulder flexion GTB x 10 L   Resisted shoulder abduction GTB x 10 L     Manual Therapy - 15 min   PA of left shoulder grade III-IV mobs  Inferior mobs of left shoulder grade III-IV  PROM IR, ER, flexion & abduction on left shoulder Manual Therapy - 10 min   PA of left shoulder grade III-IV mobs  Inferior mobs of left shoulder grade III-IV  PROM flexion & abduction on left shoulder Manual Therapy - 15 min   PA of left shoulder grade III-IV mobs  Inferior mobs of left shoulder grade III-IV  STM to infraspinatus and supraspinatus, bicep muscle belly on left                    HEP: Access Code: [email or web address]  URL:  [email or web address]  Date: 07/10/2024  Prepared by: Edward King    Program Notes  Rommel Kenna    Exercises  - Seated Scapular Retraction  - 2 x daily - 7 x weekly - 2 sets - 10 reps - 3 hold  - Standing Shoulder Posterior Capsule Stretch  - 2 x daily - 7 x weekly - 3 sets - 25 hold  - Supine Cross Body Shoulder Stretch  - 2 x daily - 7 x weekly - 3 sets - 25 hold  - Sleeper Stretch  - 3 x daily - 7 x weekly - 3 sets - 25 hold  - Standing Shoulder Internal Rotation Stretch with Towel  - 2 x daily - 7 x weekly - 2 sets - 10 reps - 5 hold  - Shoulder External Rotation with Anchored Resistance  - 2 x daily - 3 x weekly - 2 sets - 10 reps  - Shoulder Internal Rotation with Resistance  - 2 x daily - 3 x weekly - 2 sets - 10 reps  - Standing Shoulder Row with Anchored Resistance  - 2 x daily - 3 x weekly - 2 sets - 15 reps  - Scapular Retraction with Resistance Advanced  - 2 x daily - 3 x weekly - 2 sets - 15 reps  - Wall Push Up  - 2 x daily - 3 x weekly - 2 sets - 10 reps    Charges: 1 MT 2 TherEx       Total Timed Treatment: 45 min  Total Treatment Time: 45 min

## 2024-07-15 ENCOUNTER — OFFICE VISIT (OUTPATIENT)
Dept: PHYSICAL THERAPY | Age: 74
End: 2024-07-15
Attending: INTERNAL MEDICINE
Payer: MEDICARE

## 2024-07-15 PROCEDURE — 97140 MANUAL THERAPY 1/> REGIONS: CPT

## 2024-07-15 PROCEDURE — 97110 THERAPEUTIC EXERCISES: CPT

## 2024-07-15 NOTE — PROGRESS NOTES
Diagnosis:   Rotator cuff tendonitis, left (M75.82)          Referring Provider: Jhon Paula  Date of Evaluation:    6/25/2024    Precautions:  None Next MD visit:   none scheduled  Date of Surgery: n/a   Insurance Primary/Secondary: HUMANA MCR / HUMANA     # Auth Visits: 8 visits            Subjective: The exercises are going well. There are times when there is a little bit of pain along the lateral arm    Pain: 4/10 left posterior shoulder pain, 3/10 at end of session       Objective:   Comparable Sign: Left Shoulder Flexion  Start of session: 160 deg    End of session: 160 deg    PROM: 180 deg         Assessment: patient tolerated session well. Patient presented to session with mild complaints of left shoulder pain. Patient continues to report that the pain increases with completion of work tasks, which requires his left arm to be in an elevated position for prolonged periods of time. Patient reports that if he rests, that the pain will decrease throughout the day. Further strengthening was completed and well tolerated. At conclusion of session, the patient reported decreased symptoms. Patient instructed to call and schedule more appointments.       Goals: (to be met in 10 visits)   Pt will report improved ability to sleep without waking due to shoulder pain MET  Pt will improve shoulder flexion AROM to >170 degrees to be able to reach into overhead cabinets without pain or restriction   Pt will increase shoulder AROM ER to 85 to be able to reach and fasten seatbelt   Pt will increase shoulder AROM IR to 70 to be able to reach in back pocket, tuck in shirt, and turn steering wheel without pain  Pt will improve shoulder strength throughout to 4/5 to improve function with lifting objects greater than 8 pounds    Pt will demonstrate increased mid/low trap strength to 4/5 to promote improved shoulder mechanics and stabilization with lifting and reaching   Pt will be independent and compliant with comprehensive HEP to  maintain progress achieved in PT     Plan: continue with AROM, rhythmic stabilization and strengthening   Date: 7/3/2024  TX#: 2/8 Date:7/8/2024                 TX#: 3/8 Date:7/10/2024                 TX#: 4/8 Date:7/15/2024                 TX#: 5/8 Date:   Tx#: 6/   TherEx - 30 min  Arm Bike level 1, 2 min forward, 2 min backwards   Seated scapular retractions 2 x 10  Supine posterior capsule stretch left 3 x 25 sec hold  Sleeper Stretch left 3 x 25 sec hold  Banded Rows GTB x 15, BTB x 15  Resisted scapular retractions BTB 2 x 10 TherEx - 35 min   Arm Bike level 1.5, 3 min forward, 3 min backwards   Supine posterior capsule stretch left 4 x 20 sec holds   Sleeper Stretch left 4 x 25 sec hold  Supine serratus punches 2 x 10   Seated shoulder flexion x 15  Banded rows BTB 2 x 15  Resisted scapular retractions BTB 2 x 15  AAROM with dowel shoulder flexion x 15  Standing IR stretch with strap 10 x 5 sec holds   Resisted shoulder flexion GTB x 10 L   Resisted shoulder abduction GTB x 10 L TherEx - 30 min   Arm bike level 1.5, 3 min forward, 3 min backwards  Supine posterior capsule stretch left 4 x 20 sec holds   Sleeper Stretch left 4 x 25 sec hold  Supine serratus punches 2 x 10   Supine serratus stabilizations 2 x 30 sec  Wall pushups 2 x 10   Banded rows BTB 2 x 15  Resisted scapular retractions BTB 2 x 15  Banded ER with GTB 2 x 10 on L  Banded IR with GTB 2 x 10 on L   Resisted shoulder flexion GTB x 10 L   Resisted shoulder abduction GTB x 10 L TherEx - 30 min  Arm bike level 1.5, 3 min forward, 3 min backwards  AAROM shoulder flexion with dowel 2 x 10  Supine posterior capsule stretch left 4 x 25 sec holds  Supine serratus punches 2 x 10 left  Supine serratus stabilizations 2 x 30 sec left  Prone scapular rows 2 x 10, 2 lb weights   Wall pushups 2 x 10  Banded wall walks GTB x 2  TRX low rows x 10  Resisted shoulder flexion GTB 2 x 10 L   Resisted shoulder abduction GTB 2 x 10 L    Manual Therapy - 15 min   PA  of left shoulder grade III-IV mobs  Inferior mobs of left shoulder grade III-IV  PROM IR, ER, flexion & abduction on left shoulder Manual Therapy - 10 min   PA of left shoulder grade III-IV mobs  Inferior mobs of left shoulder grade III-IV  PROM flexion & abduction on left shoulder Manual Therapy - 15 min   PA of left shoulder grade III-IV mobs  Inferior mobs of left shoulder grade III-IV  STM to infraspinatus and supraspinatus, bicep muscle belly on left  Manual Therapy - 15 min   PA of left shoulder grade III-IV mobs  Inferior mobs of left shoulder grade III-IV  STM to infraspinatus and supraspinatus, bicep muscle belly on left                   HEP: Access Code: [email or web address]  URL: [email or web address]  Date: 07/15/2024  Prepared by: Edward King    Program Notes  Rommel Kenna    Exercises  - Seated Scapular Retraction  - 2 x daily - 7 x weekly - 2 sets - 10 reps - 3 hold  - Standing Shoulder Posterior Capsule Stretch  - 2 x daily - 7 x weekly - 3 sets - 25 hold  - Supine Cross Body Shoulder Stretch  - 2 x daily - 7 x weekly - 3 sets - 25 hold  - Sleeper Stretch  - 3 x daily - 7 x weekly - 3 sets - 25 hold  - Standing Shoulder Internal Rotation Stretch with Towel  - 2 x daily - 7 x weekly - 2 sets - 10 reps - 5 hold  - Shoulder External Rotation with Anchored Resistance  - 2 x daily - 3 x weekly - 2 sets - 10 reps  - Shoulder Internal Rotation with Resistance  - 2 x daily - 3 x weekly - 2 sets - 10 reps  - Standing Shoulder Row with Anchored Resistance  - 2 x daily - 3 x weekly - 2 sets - 15 reps  - Scapular Retraction with Resistance Advanced  - 2 x daily - 3 x weekly - 2 sets - 15 reps  - Wall Push Up  - 2 x daily - 3 x weekly - 2 sets - 10 reps  - Horizontal Wall Walk with Resistance  - 2 x daily - 7 x weekly - 1 sets    Charges: 1 MT 2 TherEx       Total Timed Treatment: 45 min  Total Treatment Time: 45 min

## 2024-07-17 ENCOUNTER — APPOINTMENT (OUTPATIENT)
Dept: PHYSICAL THERAPY | Age: 74
End: 2024-07-17
Attending: INTERNAL MEDICINE
Payer: MEDICARE

## 2024-07-17 ENCOUNTER — OFFICE VISIT (OUTPATIENT)
Dept: PHYSICAL THERAPY | Age: 74
End: 2024-07-17
Attending: INTERNAL MEDICINE
Payer: MEDICARE

## 2024-07-17 PROCEDURE — 97110 THERAPEUTIC EXERCISES: CPT

## 2024-07-17 PROCEDURE — 97140 MANUAL THERAPY 1/> REGIONS: CPT

## 2024-07-17 NOTE — PROGRESS NOTES
Diagnosis:   Rotator cuff tendonitis, left (M75.82)          Referring Provider: Jhon Paula  Date of Evaluation:    6/25/2024    Precautions:  None Next MD visit:   none scheduled  Date of Surgery: n/a   Insurance Primary/Secondary: HUMANA ODELL / HUMANA     # Auth Visits: 8 visits            Subjective: I still get the pain with prolonged overhead positions.     Pain: 4/10 left posterior shoulder pain, 2/10 at end of session   5/10 left lateral deltoid pain, 3/10 at end of session     Objective:   Comparable Sign: Left Shoulder Abduction  Start of session: 135* lateral shoulder pain deg    End of session: 150 * decreased compared to start of session    PROM: 180 deg         Assessment: patient tolerated session well. Patient presented to session with mild complaints of left shoulder pain. Patient continues to report that the pain increases with completion of work tasks, which requires his left arm to be in an elevated position for prolonged periods of time. Patient demonstrated improvement with left shoulder flexion. Patient's left shoulder abduction is limited by pain. Increased range following completion of manual therapy.       Goals: (to be met in 10 visits)   Pt will report improved ability to sleep without waking due to shoulder pain MET  Pt will improve shoulder flexion AROM to >170 degrees to be able to reach into overhead cabinets without pain or restriction   Pt will increase shoulder AROM ER to 85 to be able to reach and fasten seatbelt   Pt will increase shoulder AROM IR to 70 to be able to reach in back pocket, tuck in shirt, and turn steering wheel without pain  Pt will improve shoulder strength throughout to 4/5 to improve function with lifting objects greater than 8 pounds    Pt will demonstrate increased mid/low trap strength to 4/5 to promote improved shoulder mechanics and stabilization with lifting and reaching   Pt will be independent and compliant with comprehensive HEP to maintain progress  achieved in PT     Plan: continue with AROM, rhythmic stabilization and strengthening   Date: 7/3/2024  TX#: 2/8 Date:7/8/2024                 TX#: 3/8 Date:7/10/2024                 TX#: 4/8 Date:7/15/2024                 TX#: 5/8 Date:7/17/2024   Tx#: 6/8   TherEx - 30 min  Arm Bike level 1, 2 min forward, 2 min backwards   Seated scapular retractions 2 x 10  Supine posterior capsule stretch left 3 x 25 sec hold  Sleeper Stretch left 3 x 25 sec hold  Banded Rows GTB x 15, BTB x 15  Resisted scapular retractions BTB 2 x 10 TherEx - 35 min   Arm Bike level 1.5, 3 min forward, 3 min backwards   Supine posterior capsule stretch left 4 x 20 sec holds   Sleeper Stretch left 4 x 25 sec hold  Supine serratus punches 2 x 10   Seated shoulder flexion x 15  Banded rows BTB 2 x 15  Resisted scapular retractions BTB 2 x 15  AAROM with dowel shoulder flexion x 15  Standing IR stretch with strap 10 x 5 sec holds   Resisted shoulder flexion GTB x 10 L   Resisted shoulder abduction GTB x 10 L TherEx - 30 min   Arm bike level 1.5, 3 min forward, 3 min backwards  Supine posterior capsule stretch left 4 x 20 sec holds   Sleeper Stretch left 4 x 25 sec hold  Supine serratus punches 2 x 10   Supine serratus stabilizations 2 x 30 sec  Wall pushups 2 x 10   Banded rows BTB 2 x 15  Resisted scapular retractions BTB 2 x 15  Banded ER with GTB 2 x 10 on L  Banded IR with GTB 2 x 10 on L   Resisted shoulder flexion GTB x 10 L   Resisted shoulder abduction GTB x 10 L TherEx - 30 min  Arm bike level 1.5, 3 min forward, 3 min backwards  AAROM shoulder flexion with dowel 2 x 10  Supine posterior capsule stretch left 4 x 25 sec holds  Supine serratus punches 2 x 10 left  Supine serratus stabilizations 2 x 30 sec left  Prone scapular rows 2 x 10, 2 lb weights   Wall pushups 2 x 10  Banded wall walks GTB x 2  TRX low rows x 10  Resisted shoulder flexion GTB 2 x 10 L   Resisted shoulder abduction GTB 2 x 10 L TherEx - 25 min   Arm bike, level 2, 3  min forward, 3 min backwards  Supine serratus punches 2 x 10 left  Supine serratus stabilizations with 2 lb weight 2 x 30 sec left  Prone scapular rows 2 x 12, 2 lb weights   Shoulder flexion with 2 lb weights 2 x 10   Shoulder abduction with 2 lb weights 2 x 10  Wall pushups 2 x 10  Banded wall walks GTB x 2   Manual Therapy - 15 min   PA of left shoulder grade III-IV mobs  Inferior mobs of left shoulder grade III-IV  PROM IR, ER, flexion & abduction on left shoulder Manual Therapy - 10 min   PA of left shoulder grade III-IV mobs  Inferior mobs of left shoulder grade III-IV  PROM flexion & abduction on left shoulder Manual Therapy - 15 min   PA of left shoulder grade III-IV mobs  Inferior mobs of left shoulder grade III-IV  STM to infraspinatus and supraspinatus, bicep muscle belly on left  Manual Therapy - 15 min   PA of left shoulder grade III-IV mobs  Inferior mobs of left shoulder grade III-IV  STM to infraspinatus and supraspinatus, bicep muscle belly on left  Manual Therapy - 20 min   PA of left shoulder grade III-IV mobs  Inferior mobs of left shoulder grade III-IV  STM to infraspinatus and supraspinatus, bicep muscle belly on left   STM and tool to left lateral deltoid and biceps                  HEP: Access Code: [email or web address]  URL: [email or web address]  Date: 07/15/2024  Prepared by: Edward King    Program Notes  Rommel Kenna    Exercises  - Seated Scapular Retraction  - 2 x daily - 7 x weekly - 2 sets - 10 reps - 3 hold  - Standing Shoulder Posterior Capsule Stretch  - 2 x daily - 7 x weekly - 3 sets - 25 hold  - Supine Cross Body Shoulder Stretch  - 2 x daily - 7 x weekly - 3 sets - 25 hold  - Sleeper Stretch  - 3 x daily - 7 x weekly - 3 sets - 25 hold  - Standing Shoulder Internal Rotation Stretch with Towel  - 2 x daily - 7 x weekly - 2 sets - 10 reps - 5 hold  - Shoulder External Rotation with Anchored Resistance  - 2 x daily - 3 x weekly - 2 sets - 10 reps  - Shoulder Internal  Rotation with Resistance  - 2 x daily - 3 x weekly - 2 sets - 10 reps  - Standing Shoulder Row with Anchored Resistance  - 2 x daily - 3 x weekly - 2 sets - 15 reps  - Scapular Retraction with Resistance Advanced  - 2 x daily - 3 x weekly - 2 sets - 15 reps  - Wall Push Up  - 2 x daily - 3 x weekly - 2 sets - 10 reps  - Horizontal Wall Walk with Resistance  - 2 x daily - 7 x weekly - 1 sets    Charges: 1 MT 2 TherEx       Total Timed Treatment: 45 min  Total Treatment Time: 45 min

## 2024-07-30 DIAGNOSIS — M25.552 LEFT HIP PAIN: Primary | ICD-10-CM

## 2024-07-31 ENCOUNTER — HOSPITAL ENCOUNTER (OUTPATIENT)
Dept: GENERAL RADIOLOGY | Age: 74
Discharge: HOME | End: 2024-07-31
Attending: ORTHOPAEDIC SURGERY
Payer: MEDICARE

## 2024-07-31 ENCOUNTER — OFFICE VISIT (OUTPATIENT)
Dept: ORTHOPEDICS CLINIC | Facility: CLINIC | Age: 74
End: 2024-07-31
Payer: MEDICARE

## 2024-07-31 VITALS — WEIGHT: 147 LBS | HEIGHT: 64 IN | BODY MASS INDEX: 25.1 KG/M2

## 2024-07-31 DIAGNOSIS — M16.12 PRIMARY OSTEOARTHRITIS OF LEFT HIP: Primary | ICD-10-CM

## 2024-07-31 DIAGNOSIS — M25.552 LEFT HIP PAIN: ICD-10-CM

## 2024-07-31 PROCEDURE — 73502 X-RAY EXAM HIP UNI 2-3 VIEWS: CPT | Performed by: ORTHOPAEDIC SURGERY

## 2024-07-31 PROCEDURE — 1160F RVW MEDS BY RX/DR IN RCRD: CPT | Performed by: ORTHOPAEDIC SURGERY

## 2024-07-31 PROCEDURE — 3008F BODY MASS INDEX DOCD: CPT | Performed by: ORTHOPAEDIC SURGERY

## 2024-07-31 PROCEDURE — 1159F MED LIST DOCD IN RCRD: CPT | Performed by: ORTHOPAEDIC SURGERY

## 2024-07-31 PROCEDURE — 99204 OFFICE O/P NEW MOD 45 MIN: CPT | Performed by: ORTHOPAEDIC SURGERY

## 2024-07-31 RX ORDER — MELOXICAM 7.5 MG/1
7.5 TABLET ORAL DAILY
Qty: 40 TABLET | Refills: 0 | Status: SHIPPED | OUTPATIENT
Start: 2024-07-31 | End: 2024-09-09

## 2024-07-31 NOTE — H&P
[facility] New Patient Note    CC:   Chief Complaint   Patient presents with    Hip Pain     LT LATERAL HIP PAIN, ONSET: 6-7YRS AGO ; SEVERE ONSET: 3-4YRS AGO ; REF BY PCP: MITA RODRIGUEZ MD       HPI: This 73 year old male presents today with complaints of left hip pain.  He reports this been going on for years and worsening.  Points the proximal lateral thigh when asked where the symptoms occur.  It is most prominent when he is walking, notes going walk for exercise does reproduce the pain.  He states in the past he did have an injection that provided 3 months of relief, this was years ago.  Additionally has taken meloxicam with good relief.  Pain has become more activity limiting.    Past Medical History:    Abdominal hernia    Arthritis    Bloating    Frequent urination    Hemorrhoids    Irregular bowel habits    Pain in joints    Pain with bowel movements    Wears glasses     Past Surgical History:   Procedure Laterality Date    Cataract Bilateral     Colonoscopy       Current Outpatient Medications   Medication Sig Dispense Refill    hydrocortisone (ANUSOL-HC) 2.5 % External Cream Place 1 Application rectally 2 (two) times daily as needed for Hemorrhoids. 14 each 1    clotrimazole-betamethasone 1-0.05 % External Cream Apply 1 Application topically 2 (two) times daily as needed. 1 each 1    Misc Natural Products (GLUCOSAMINE CHOND CMP ADVANCED OR) Take by mouth.      Homeopathic Products (ZINC COLD THERAPY OR) Take by mouth.      Multiple Vitamin (MULTI-VITAMIN) Oral Tab Take 1 tablet by mouth daily.       No Known Allergies  History reviewed. No pertinent family history.  Social History     Occupational History    Not on file   Tobacco Use    Smoking status: Never    Smokeless tobacco: Never   Vaping Use    Vaping status: Never Used   Substance and Sexual Activity    Alcohol use: No    Drug use: No    Sexual activity: Not on file        ROS:  Detailed system review obtained and negative except as mentioned  above      Physical Exam:    Ht 5' 4\" (1.626 m)   Wt 147 lb (66.7 kg)   BMI 25.23 kg/m²   Constitutional: Awake, alert, no distress.  Present with spouse, both very pleasant  Psychological: Appropriate affect.  Respiratory: Unlabored breathing.  Left lower extremity:  Inspection: skin intact, no effusion  Palpation: Mild tenderness over the greater trochanter  Range of motion: Passive hip motion is significantly limited, internal rotation short of neutral, external rotation short of 20, does cause pain  [facility] positive  Neuromuscular: 4+ out of 5 hip flexion strength  Vascular: Warm well-perfused      Imaging: X-rays of the left hip and pelvis personally viewed, independently interpreted and radiology report read.  Demonstrates radiographically severe osteoarthritis of the left hip with complete loss of joint space/bone-on-bone, extensive subchondral sclerosis, irregularity and flattening of the contour of the subchondral bone of the femoral head and large subchondral cysts.      Labs: Creatinine 1.23, GFR 62      Assessment/Plan:  Diagnoses and all orders for this visit:    Primary osteoarthritis of left hip      Assessment: 73-year-old male with symptomatic radiographically severe left hip osteoarthritis    Plan: I discussed the etiology, natural history, and management options for symptomatic hip osteoarthritis.  I discussed nonsurgical and surgical treatments, with nonsurgical treatments to include anti-inflammatory medications, injections, activity modification, weight loss, low impact exercise and possible therapy.  Surgery would be with hip replacement and is an elective operation reserved for when nonsurgical treatments no longer alleviate symptoms sufficiently.  He expressed understanding would like to proceed with nonsurgical treatment.  He has taken meloxicam in the past with good relief.  He would consider a refill on this, and I did provide a prescription of meloxicam for him today.  We did  discuss monitoring kidneys as well as GI symptoms if taking for the long-term.  Additionally he is interested in considering injection.  I did provide information for my partner Dr. Gonzalez if he would like to proceed with getting set up for hip injection.  We did briefly discuss hip replacement surgery as well, but he would like to attempt nonsurgical treatment first.    Elbert Miller MD, [facility]  [facility]  Phone [phone]  Fax [phone]

## 2024-08-16 ENCOUNTER — PATIENT MESSAGE (OUTPATIENT)
Dept: INTERNAL MEDICINE CLINIC | Facility: CLINIC | Age: 74
End: 2024-08-16

## 2024-08-16 DIAGNOSIS — M17.12 PRIMARY OSTEOARTHRITIS OF LEFT KNEE: Primary | ICD-10-CM

## 2024-08-16 DIAGNOSIS — M87.052 AVASCULAR NECROSIS OF FEMORAL HEAD, LEFT (HCC): ICD-10-CM

## 2024-08-20 NOTE — TELEPHONE ENCOUNTER
From: Rommel Pierson  To: Jhon Paula  Sent: 2024 9:56 AM CDT  Subject: Request for Permanent Disabled parking placards    Hi,   This is in regards to my last visit in , during which you issued me a form to submit at [facility] parking placard. They issued me temporary placard which will  soon. In order to get Permanent Disabled Parking Placard you have to mailed my application to:     Persons with Disabilities Placard Unit  [address] [place] [address], [address],  [place], [place] [address].  I request you to please submit this application for me in order to continue my placard.  Thank you.

## 2024-09-19 ENCOUNTER — TELEPHONE (OUTPATIENT)
Dept: INTERNAL MEDICINE CLINIC | Facility: CLINIC | Age: 74
End: 2024-09-19

## 2024-09-20 NOTE — TELEPHONE ENCOUNTER
----- Message from [employer]  sent at 9/19/2024 11:03 AM CDT -----  Regarding: Permanent disability parking placard.  Contact: [phone]  Hi,  During my last conversation with you in my chart I request you to send my disability form to , but there is no reply. Shall I collect the required form from your office and then mail it to the authority, please advise.  Thank you.

## 2024-09-20 NOTE — TELEPHONE ENCOUNTER
Spoke with patient this morning after investigating this matter. There is no documentation stating this placard was mailed. There is a letter created via [employer]. Letter printed, re signed by JEFF. Called patient to apologize for this inconvenience. Typically it could take 6-8 weeks for a permanent placard to be received in the mail. Patient asked to come into the office to sign application. I asked the patient to ask for me when he arrives. I will give the patient a copy and mail the form today. This will be documented after patient arrives. Patient expressed understanding.   Copy will be at the . PSR's will be notified to contact me when patient arrives.

## 2024-09-23 NOTE — TELEPHONE ENCOUNTER
Called patient and no answer. Left a vm reminding pt to come in to sign placard so we could mail it out.

## 2024-10-08 RX ORDER — MELOXICAM 7.5 MG/1
7.5 TABLET ORAL DAILY
Qty: 40 TABLET | Refills: 0 | Status: SHIPPED | OUTPATIENT
Start: 2024-10-08 | End: 2024-11-17

## 2024-10-08 NOTE — TELEPHONE ENCOUNTER
Meloxicam 7.5 mg  DOS: N/A  Last OV: 07/31/24  Last refill date: 07/31/24     #/refills: 40/0  Upcoming appt: No future appointments.    04/20/24  UREA NITROGEN (BUN)  7 - 25 mg/dL 13   CREATININE  0.70 - 1.28 mg/dL 1.23   EGFR  > OR = 60 mL/min/1.73m2 62

## 2024-11-11 RX ORDER — MELOXICAM 7.5 MG/1
7.5 TABLET ORAL
Qty: 40 TABLET | Refills: 0 | Status: SHIPPED | OUTPATIENT
Start: 2024-11-11

## 2024-11-11 NOTE — TELEPHONE ENCOUNTER
Meloxicam 7.5 mg  DOS: N/A  Last OV: 07/31/24  Last refill date: 10/8/24     #/refills: 40/0  Upcoming appt: No future appointments.    04/20/24  UREA NITROGEN (BUN)  7 - 25 mg/dL 13   CREATININE  0.70 - 1.28 mg/dL 1.23   EGFR  > OR = 60 mL/min/1.73m2 62     
no

## 2024-12-16 RX ORDER — MELOXICAM 7.5 MG/1
7.5 TABLET ORAL DAILY PRN
Qty: 40 TABLET | Refills: 0 | Status: SHIPPED | OUTPATIENT
Start: 2024-12-16

## 2024-12-16 NOTE — TELEPHONE ENCOUNTER
Meloxicam 7.5 mg    DOS: n/a  Last OV: 7/31/24  Last refill date: 11/11/24     #/refills: 40/0  Upcoming appt: No future appointments.    4/20/24  UREA NITROGEN (BUN)  7 - 25 mg/dL 13   CREATININE  0.70 - 1.28 mg/dL 1.23     EGFR  > OR = 60 mL/min/1.73m2 62

## 2025-02-05 ENCOUNTER — TELEPHONE (OUTPATIENT)
Dept: INTERNAL MEDICINE CLINIC | Facility: CLINIC | Age: 75
End: 2025-02-05

## 2025-02-05 NOTE — TELEPHONE ENCOUNTER
Left message to call back to schedule Supervisit for 2025  [facility]     
Medications/Imaging Studies/Labs/EKG

## 2025-02-20 ENCOUNTER — TELEPHONE (OUTPATIENT)
Dept: INTERNAL MEDICINE CLINIC | Facility: CLINIC | Age: 75
End: 2025-02-20

## 2025-02-20 DIAGNOSIS — R73.03 PREDIABETES: ICD-10-CM

## 2025-02-20 DIAGNOSIS — Z00.00 ROUTINE GENERAL MEDICAL EXAMINATION AT A HEALTH CARE FACILITY: Primary | ICD-10-CM

## 2025-02-20 DIAGNOSIS — E78.2 MIXED HYPERLIPIDEMIA: ICD-10-CM

## 2025-02-20 DIAGNOSIS — E03.8 SUBCLINICAL HYPOTHYROIDISM: ICD-10-CM

## 2025-02-20 NOTE — TELEPHONE ENCOUNTER
Patient called request labs prior to their annual physical.  Annual physical scheduled for   Future Appointments   Date Time Provider Department Center   5/6/2025  1:00 PM Jhon Paula MD [facility]      Please order labs. Patient preferred lab is [facility]  Patient informed request was sent to clinical team.  Patient informed to fast for labs.  No callback required.

## 2025-05-02 LAB
ABSOLUTE BASOPHILS: 30 CELLS/UL (ref 0–200)
ABSOLUTE EOSINOPHILS: 180 CELLS/UL (ref 15–500)
ABSOLUTE LYMPHOCYTES: 1480 CELLS/UL (ref 850–3900)
ABSOLUTE MONOCYTES: 625 CELLS/UL (ref 200–950)
ABSOLUTE NEUTROPHILS: 2685 CELLS/UL (ref 1500–7800)
ALBUMIN/GLOBULIN RATIO: 1.5 (CALC) (ref 1–2.5)
ALBUMIN: 4.2 G/DL (ref 3.6–5.1)
ALKALINE PHOSPHATASE: 102 U/L (ref 35–144)
ALT: 24 U/L (ref 9–46)
AST: 21 U/L (ref 10–35)
BASOPHILS: 0.6 %
BILIRUBIN, TOTAL: 0.6 MG/DL (ref 0.2–1.2)
BUN: 11 MG/DL (ref 7–25)
CALCIUM: 9.1 MG/DL (ref 8.6–10.3)
CARBON DIOXIDE: 25 MMOL/L (ref 20–32)
CHLORIDE: 101 MMOL/L (ref 98–110)
CHOL/HDLC RATIO: 4.7 (CALC)
CHOLESTEROL, TOTAL: 164 MG/DL
CREATININE: 1.17 MG/DL (ref 0.7–1.28)
EGFR: 65 ML/MIN/1.73M2
EOSINOPHILS: 3.6 %
GLOBULIN: 2.8 G/DL (CALC) (ref 1.9–3.7)
GLUCOSE: 98 MG/DL (ref 65–99)
HDL CHOLESTEROL: 35 MG/DL
HEMATOCRIT: 45.4 % (ref 38.5–50)
HEMOGLOBIN: 15 G/DL (ref 13.2–17.1)
LDL-CHOLESTEROL: 105 MG/DL (CALC)
LYMPHOCYTES: 29.6 %
MCH: 32.3 PG (ref 27–33)
MCHC: 33 G/DL (ref 32–36)
MCV: 97.8 FL (ref 80–100)
MONOCYTES: 12.5 %
MPV: 10.2 FL (ref 7.5–12.5)
NEUTROPHILS: 53.7 %
NON-HDL CHOLESTEROL: 129 MG/DL (CALC)
PLATELET COUNT: 217 THOUSAND/UL (ref 140–400)
POTASSIUM: 4.3 MMOL/L (ref 3.5–5.3)
PROTEIN, TOTAL: 7 G/DL (ref 6.1–8.1)
RDW: 13.5 % (ref 11–15)
RED BLOOD CELL COUNT: 4.64 MILLION/UL (ref 4.2–5.8)
SODIUM: 136 MMOL/L (ref 135–146)
T4, FREE: 1 NG/DL (ref 0.8–1.8)
TRIGLYCERIDES: 142 MG/DL
TSH W/REFLEX TO FT4: 7.15 MIU/L (ref 0.4–4.5)
WHITE BLOOD CELL COUNT: 5 THOUSAND/UL (ref 3.8–10.8)

## 2025-05-06 ENCOUNTER — OFFICE VISIT (OUTPATIENT)
Dept: INTERNAL MEDICINE CLINIC | Facility: CLINIC | Age: 75
End: 2025-05-06
Payer: MEDICARE

## 2025-05-06 VITALS
BODY MASS INDEX: 24.29 KG/M2 | OXYGEN SATURATION: 98 % | WEIGHT: 145.81 LBS | SYSTOLIC BLOOD PRESSURE: 116 MMHG | TEMPERATURE: 98 F | DIASTOLIC BLOOD PRESSURE: 64 MMHG | HEART RATE: 77 BPM | RESPIRATION RATE: 16 BRPM | HEIGHT: 65 IN

## 2025-05-06 DIAGNOSIS — R73.03 PREDIABETES: ICD-10-CM

## 2025-05-06 DIAGNOSIS — M87.052 AVASCULAR NECROSIS OF FEMORAL HEAD, LEFT (HCC): ICD-10-CM

## 2025-05-06 DIAGNOSIS — R07.9 LEFT-SIDED CHEST PAIN: ICD-10-CM

## 2025-05-06 DIAGNOSIS — M17.12 PRIMARY OSTEOARTHRITIS OF LEFT KNEE: ICD-10-CM

## 2025-05-06 DIAGNOSIS — M16.12 PRIMARY OSTEOARTHRITIS OF LEFT HIP: ICD-10-CM

## 2025-05-06 DIAGNOSIS — Z47.89 ORTHOPEDIC AFTERCARE: ICD-10-CM

## 2025-05-06 DIAGNOSIS — E03.8 SUBCLINICAL HYPOTHYROIDISM: ICD-10-CM

## 2025-05-06 DIAGNOSIS — Z00.00 ENCOUNTER FOR ANNUAL HEALTH EXAMINATION: Primary | ICD-10-CM

## 2025-05-06 PROBLEM — I87.2 VENOUS STASIS DERMATITIS OF BOTH LOWER EXTREMITIES: Status: RESOLVED | Noted: 2022-08-09 | Resolved: 2025-05-06

## 2025-05-06 LAB
ATRIAL RATE: 72 BPM
P AXIS: 55 DEGREES
P-R INTERVAL: 138 MS
Q-T INTERVAL: 380 MS
QRS DURATION: 82 MS
QTC CALCULATION (BEZET): 416 MS
R AXIS: 24 DEGREES
T AXIS: 25 DEGREES
VENTRICULAR RATE: 72 BPM

## 2025-05-06 RX ORDER — TAMSULOSIN HYDROCHLORIDE 0.4 MG/1
0.4 CAPSULE ORAL DAILY
Qty: 90 CAPSULE | Refills: 0 | Status: SHIPPED | OUTPATIENT
Start: 2025-05-06 | End: 2025-08-04

## 2025-05-06 NOTE — PROGRESS NOTES
Subjective:   Rommel Pierson is a 74 year old male who presents for a MA AHA (Medicare Advantage Annual Health Assessment) and Medicare Subsequent Annual Wellness visit (Pt already had Initial Annual Wellness) and scheduled follow up of multiple significant but stable problems.   History of Present Illness      Since last evaluation the patient has overall maintained his usual state of health.  He does report an intermittent left-sided chest pain, described as a dull and soreness in a generalized area, the symptoms of which last for approximately 10 minutes at a time and resolve spontaneously.  There is no identified trigger for the onset of symptoms, including exertion as his symptoms most frequently occur while at rest.  There is no positional trigger to symptoms, however they do often occur while lying supine at night.  At the time of symptoms he does not experience an associated shortness of breath, palpitations, lightheadedness, or nausea.  Symptoms have remained intermittent and stable for several months.  Furthermore, he denies any symptoms associated with hypothyroidism.  TSH has remained variably elevated between 6 and 7-1/2, with free T4 generally around 0.8-1.0.    History/Other:   Fall Risk Assessment:   He has been screened for Falls and is High Risk. Fall Prevention information provided to patient in After Visit Summary.    Do you feel unsteady when standing or walking?: Yes  Do you worry about falling?: Yes  Have you fallen in the past year?: No     Cognitive Assessment:   He had a completely normal cognitive assessment - see flowsheet entries     Functional Ability/Status:   Rommel Pierson has some abnormal functions as listed below:  He has Meal Preparation difficulties based on screening of functional status.He has Hearing problems based on screening of functional status.He has Vision problems based on screening of functional status. He has Walking problems based on screening of functional status.  He has problems with Daily Activities based on screening of functional status.       Depression Screening (PHQ):  PHQ-2 SCORE: 0  , done 5/6/2025             Advanced Directives:   He does NOT have a Living Will. [Do you have a living will?: No]  He does NOT have a Power of  for Health Care. [Do you have a healthcare power of ?: No]  Discussed Advance Care Planning with patient (and family/surrogate if present). Standard forms made available to patient in After Visit Summary.      Patient Active Problem List   Diagnosis    Avascular necrosis of femoral head, left (HCC)    arthroscopy acromioplasty with rotator cuff repair  Global 5/1/2017    Venous stasis dermatitis of both lower extremities    Primary osteoarthritis of left knee    Prediabetes    Primary osteoarthritis of left hip     Allergies:  He has no known allergies.    Current Medications:  Outpatient Medications Marked as Taking for the 5/6/25 encounter (Office Visit) with Jhon Paula MD   Medication Sig    tamsulosin 0.4 MG Oral Cap Take 1 capsule (0.4 mg total) by mouth daily.       Medical History:  He  has a past medical history of Abdominal hernia, Arthritis (January 2012), Bloating, Frequent urination, Hemorrhoids, Irregular bowel habits, Pain in joints, Pain with bowel movements, and Wears glasses.  Surgical History:  He  has a past surgical history that includes cataract (Bilateral) and colonoscopy.   Family History:  His family history is not on file.  Social History:  He  reports that he has never smoked. He has never used smokeless tobacco. He reports that he does not drink alcohol and does not use drugs.    Tobacco:  He has never smoked tobacco.    CAGE Alcohol Screen:   CAGE screening score of 0 on 5/6/2025, showing low risk of alcohol abuse.      Patient Care Team:  Jhon Paula MD as PCP - General (Internal Medicine)  Parris Fink PT as Physical Therapist  Edward King PT as Physical Therapist (Physical  Therapy)    Review of Systems  GENERAL: feels well otherwise  SKIN: denies any unusual skin lesions  EYES: denies blurred vision or double vision  HEENT: denies nasal congestion, sinus pain or ST  LUNGS: denies shortness of breath with exertion  CARDIOVASCULAR: denies chest pain on exertion  GI: denies abdominal pain, denies heartburn  : 3 per night nocturia, no complaint of urinary incontinence  MUSCULOSKELETAL: denies back pain  NEURO: denies headaches  PSYCHE: denies depression or anxiety  HEMATOLOGIC: denies hx of anemia  ENDOCRINE: denies thyroid history  ALL/ASTHMA: denies hx of allergy or asthma    Objective:   Physical Exam  General Appearance:  Alert, cooperative, no distress, appears stated age   Head:  Normocephalic, without obvious abnormality, atraumatic   Eyes:  Bilateral conjunctiva within normal limits   Ears:  Tympanic membrane within normal limits bilaterally   Nose: Deferred   Throat: Deferred   Neck: Supple, symmetrical, trachea midline, no adenopathy, thyroid: not enlarged, symmetric, no tenderness/mass/nodules, no carotid bruit or JVD   Back:   Symmetric, no curvature, ROM normal, no CVA tenderness   Lungs:   Clear to auscultation bilaterally, respirations unlabored   Chest Wall:  No tenderness or deformity   Heart:  Regular rate and rhythm, S1, S2 normal, no murmur, rub or gallop   Abdomen:   Soft, non-tender, bowel sounds active all four quadrants,  no masses, no organomegaly   Genitalia: Deferred   Rectal: Deferred   Extremities: No edema   Pulses: 2+ and symmetric   Skin: Skin color, texture, turgor normal, no rashes or lesions   Lymph nodes: Cervical nodes normal   Neurologic: Amber grossly normal l     /64   Pulse 77   Temp 98.3 °F (36.8 °C) (Temporal)   Resp 16   Ht 5' 5\" (1.651 m)   Wt 145 lb 12.8 oz (66.1 kg)   SpO2 98%   BMI 24.26 kg/m²  Estimated body mass index is 24.26 kg/m² as calculated from the following:    Height as of this encounter: 5' 5\" (1.651 m).    Weight  as of this encounter: 145 lb 12.8 oz (66.1 kg).    Medicare Hearing Assessment:   Hearing Screening    Time taken: 5/6/2025  1:21 PM  Entry User: Ciara Yuan CMA  Screening Method: Finger Rub  Finger Rub Result: Pass         Visual Acuity:   Right Eye Visual Acuity: Corrected Right Eye Chart Acuity: 20/25   Left Eye Visual Acuity: Corrected Left Eye Chart Acuity: 20/25   Both Eyes Visual Acuity: Corrected Both Eyes Chart Acuity: 20/25   Able To Tolerate Visual Acuity: Yes        Assessment & Plan:   Rommel Pierson is a 74 year old male who presents for a Medicare Assessment.     1. Left-sided chest pain (Primary)  -     EKG with interpretation and Report -IN OFFICE [89642]  2. Encounter for annual health examination  Other orders  -     Tamsulosin HCl; Take 1 capsule (0.4 mg total) by mouth daily.  Dispense: 90 capsule; Refill: 0    Assessment & Plan    Outstanding screening and preventive measures:  None    Left-sided chest pain:  Atypical. Nonexertional.  Unknown/Inconsistent trigger.  No significant cardiac risk factors, however cardiac etiology cannot be ruled out.    ECG: Normal sinus rhythm with ventricular rate of 72 bpm, QRS duration of 82 ms, QTc of 416 ms, SD interval 138 ms   Chemical stress test ordered    Subclinical hypothyroidism:  Asymptomatic  Repeat study in 3-6 months  Hold initiation of medical management at this time given increasing free t4    Primary OA of left knee, and chronic left hip pain secondary to OA of left hip and avascular necrosis of left femoral head:  Stable and with active symptoms  Post evaluation by ortho service  Opted in favor for conservative management at this time    The patient indicates understanding of these issues and agrees to the plan.  Reinforced healthy diet, lifestyle, and exercise.      Return in 6 months (on 11/6/2025).     Jhon Paula MD, 5/6/2025     Supplementary Documentation:   General Health:  In the past six months, have you lost more than 10  pounds without trying?: 2 - No  Has your appetite been poor?: No  Type of Diet: Vegetarian  How does the patient maintain a good energy level?: Stretching  How would you describe your daily physical activity?: Moderate  How would you describe your current health state?: Fair  How do you maintain positive mental well-being?: Social Interaction, Visiting Friends  On a scale of 0 to 10, with 0 being no pain and 10 being severe pain, what is your pain level?: 0 - (None)  In the past six months, have you experienced urine leakage?: 0-No  At any time do you feel concerned for the safety/well-being of yourself and/or your children, in your home or elsewhere?: No  Have you had any immunizations at another office such as Influenza, Hepatitis B, Tetanus, or Pneumococcal?: Yes    Health Maintenance   Topic Date Due    COVID-19 Vaccine (7 - 2024-25 season) 09/01/2024    Annual Well Visit  01/01/2025    Influenza Vaccine (Season Ended) 10/01/2025    PSA  04/20/2026    Colorectal Cancer Screening  06/05/2028    Annual Depression Screening  Completed    Fall Risk Screening (Annual)  Completed    Pneumococcal Vaccine: 50+ Years  Completed    Zoster Vaccines  Completed    Meningococcal B Vaccine  Aged Out

## 2025-05-16 ENCOUNTER — HOSPITAL ENCOUNTER (OUTPATIENT)
Dept: CV DIAGNOSTICS | Facility: HOSPITAL | Age: 75
Discharge: HOME | End: 2025-05-16
Attending: INTERNAL MEDICINE
Payer: MEDICARE

## 2025-05-16 DIAGNOSIS — R07.9 LEFT-SIDED CHEST PAIN: ICD-10-CM

## 2025-05-16 PROCEDURE — 93017 CV STRESS TEST TRACING ONLY: CPT | Performed by: INTERNAL MEDICINE

## 2025-05-16 PROCEDURE — 78452 HT MUSCLE IMAGE SPECT MULT: CPT | Performed by: INTERNAL MEDICINE

## 2025-05-16 PROCEDURE — 93018 CV STRESS TEST I&R ONLY: CPT | Performed by: INTERNAL MEDICINE

## 2025-05-16 RX ORDER — REGADENOSON 0.08 MG/ML
INJECTION, SOLUTION INTRAVENOUS
Status: COMPLETED
Start: 2025-05-16 | End: 2025-05-16

## 2025-05-16 RX ADMIN — REGADENOSON 0.4 MG: 0.08 INJECTION, SOLUTION INTRAVENOUS at 08:59:00

## 2025-08-27 ENCOUNTER — OFFICE VISIT (OUTPATIENT)
Dept: INTERNAL MEDICINE CLINIC | Facility: CLINIC | Age: 75
End: 2025-08-27

## 2025-08-27 VITALS
BODY MASS INDEX: 24.55 KG/M2 | DIASTOLIC BLOOD PRESSURE: 72 MMHG | HEART RATE: 70 BPM | TEMPERATURE: 98 F | WEIGHT: 147.38 LBS | OXYGEN SATURATION: 98 % | SYSTOLIC BLOOD PRESSURE: 134 MMHG | RESPIRATION RATE: 18 BRPM | HEIGHT: 65 IN

## 2025-08-27 DIAGNOSIS — L30.9 DERMATITIS: Primary | ICD-10-CM

## 2025-08-27 PROCEDURE — 3078F DIAST BP <80 MM HG: CPT | Performed by: NURSE PRACTITIONER

## 2025-08-27 PROCEDURE — 99213 OFFICE O/P EST LOW 20 MIN: CPT | Performed by: NURSE PRACTITIONER

## 2025-08-27 PROCEDURE — G2211 COMPLEX E/M VISIT ADD ON: HCPCS | Performed by: NURSE PRACTITIONER

## 2025-08-27 PROCEDURE — 3075F SYST BP GE 130 - 139MM HG: CPT | Performed by: NURSE PRACTITIONER

## 2025-08-27 PROCEDURE — 3008F BODY MASS INDEX DOCD: CPT | Performed by: NURSE PRACTITIONER

## 2025-08-27 PROCEDURE — 1160F RVW MEDS BY RX/DR IN RCRD: CPT | Performed by: NURSE PRACTITIONER

## 2025-08-27 PROCEDURE — 1159F MED LIST DOCD IN RCRD: CPT | Performed by: NURSE PRACTITIONER

## 2025-08-27 RX ORDER — METHYLPREDNISOLONE 4 MG/1
TABLET ORAL
Qty: 1 EACH | Refills: 0 | Status: SHIPPED | OUTPATIENT
Start: 2025-08-27 | End: 2025-08-27

## 2025-08-27 RX ORDER — METHYLPREDNISOLONE 4 MG/1
TABLET ORAL
Qty: 1 EACH | Refills: 0 | Status: SHIPPED | OUTPATIENT
Start: 2025-08-27

## (undated) DEVICE — PREMIUM WET SKIN PREP TRAY: Brand: MEDLINE INDUSTRIES, INC.

## (undated) DEVICE — SHOULDER ARTHROSCOPY CDS-LF: Brand: MEDLINE INDUSTRIES, INC.

## (undated) DEVICE — KIT ASCP FX PUSHLOCK LOPRFL

## (undated) DEVICE — CANNULA PASSPORT AR-6592-08-40

## (undated) DEVICE — KENDALL SCD EXPRESS SLEEVES, KNEE LENGTH, MEDIUM: Brand: KENDALL SCD

## (undated) DEVICE — CANNULA 5.75 CLEAR AR-6560

## (undated) DEVICE — NEEDLE SCORPION AR-13995N

## (undated) DEVICE — TIGERLINK

## (undated) DEVICE — STERILE POLYISOPRENE POWDER-FREE SURGICAL GLOVES: Brand: PROTEXIS

## (undated) DEVICE — ABDOMINAL PAD: Brand: DERMACEA

## (undated) DEVICE — PAD POLAR CARE KNEE/SHOULDER

## (undated) DEVICE — 10K/24K ARTHROSCOPY INFLOW TUBE SET: Brand: 10K/24K

## (undated) DEVICE — POLAR CARE CUBE COOLING UNIT

## (undated) DEVICE — [AUGER BUR, ARTHROSCOPIC SHAVER BLADE,  DO NOT RESTERILIZE,  DO NOT USE IF PACKAGE IS DAMAGED,  KEEP DRY,  KEEP AWAY FROM SUNLIGHT]: Brand: FORMULA

## (undated) DEVICE — [AGGRESSIVE PLUS CUTTER, ARTHROSCOPIC SHAVER BLADE,  DO NOT RESTERILIZE,  DO NOT USE IF PACKAGE IS DAMAGED,  KEEP DRY,  KEEP AWAY FROM SUNLIGHT]: Brand: FORMULA

## (undated) DEVICE — SOL LACT RINGERS 3000ML

## (undated) DEVICE — VAPR S 90 ELECTRODE 40 MM 90 DEGREES SUCTION WITH INTEGRATED HANDPIECE: Brand: VAPR

## (undated) DEVICE — SHOULDER TRACTION KIT AR-1635

## (undated) DEVICE — SUPER SPONGES,MEDIUM: Brand: KERLIX

## (undated) NOTE — MR AVS SNAPSHOT
[facility], [facility] 34 [facility]               Thank you for choosing us for your health care visit with Francheska Pulliam PT.   We are glad to serve you and happy to provide you with this summary of your Take 1 tablet by mouth every 6 (six) hours as needed for Pain. Commonly known as:  NORCO           MULTI-VITAMIN Tabs   Take 1 tablet by mouth daily. naproxen 250 MG Tabs   Take 250 mg by mouth as needed.    Commonly known as:  NAPROSYN

## (undated) NOTE — MR AVS SNAPSHOT
19 [facility] [address], [place] 34 [facility]               Thank you for choosing us for your health care visit with Lucero Smith PT.   We are glad to serve you and happy to provide you with this summary of your view more details from this visit by going to [email or web address]. If you've recently had a stay at the Hospital you can access your discharge instructions in pg40 [employer] by going to Visits < Admission Summaries.  If you've been to the Emergency Depar

## (undated) NOTE — MR AVS SNAPSHOT
19 [facility] 66, [facility] 34 [facility]               Thank you for choosing us for your health care visit with Rekha Galicia PT.   We are glad to serve you and happy to provide you with this summary of your Take 1 tablet by mouth every 6 (six) hours as needed for Pain. Commonly known as:  NORCO           MULTI-VITAMIN Tabs   Take 1 tablet by mouth daily. naproxen 250 MG Tabs   Take 250 mg by mouth as needed.    Commonly known as:  NAPROSYN

## (undated) NOTE — MR AVS SNAPSHOT
19 [facility] 66, [facility] 34 [facility]               Thank you for choosing us for your health care visit with Phong Conway PT.   We are glad to serve you and happy to provide you with this summary of your Take 1 tablet by mouth every 6 (six) hours as needed for Pain. Commonly known as:  NORCO           MULTI-VITAMIN Tabs   Take 1 tablet by mouth daily. naproxen 250 MG Tabs   Take 250 mg by mouth as needed.    Commonly known as:  NAPROSYN

## (undated) NOTE — Clinical Note
Patient Name: Kandy Pritchett  YOB: 1950          MRN number:  [id number]  Date:  4/18/2017  Referring Physician:  Debra Teague    Progress Summary  Pt has attended 10, cancelled 0, and no shown 0 visits in Physical Therapy.   Jose Phillips is 11 wee lifting and reaching. In Progress  · Pt will be independent and compliant with comprehensive HEP to maintain progress achieved in PT.  In Progress    Rehab Potential: good    Plan: Continue skilled Physical Therapy POC 1 x/week or a total of 4 more visits

## (undated) NOTE — LETTER
[facility]     [x] NEW APPLICANT  [address] [place] [id number]  [] RENEWAL            Persons with Disabilities Certification for Parking Placard  *This form is valid for three months from your physician's signature date for a Temporary Placard and six months for a Permanent Placard.    NOTE TO ALL DISABILITY LICENSE PLATE OWNERS:  If you have a disability license plate, you MUST complete the form and renew your placard.    DIRECTIONS: Both sides of this document must be signed and completed fully. All fields are required.   Applicants complete Part 1. If the applicant is a MINOR, then Parent/Guardian(s) MUST also complete Part 2. The applicant's medical professional MUSTcomplete Part 3. If the applicant is applying for meter-exempt parking, his/her medical professional MUST also complete Part 4.    PART 1:   Applicant Information (MUST have a valid [place] 's license and/or ID card)  I hereby certify  that I meet the definition of a person with a disability as provided in [address] [place] [address] 5/1-159.1, and I certify  that my physical condition entitles me to the issuance of a Persons with Disabilities Parking Placard. By affixing my signature below, I understand that the parking placard may not be used unless I am the  or passenger of the vehicle.     *If a  , please provide a copy of your  showing proof of service.     Disability Parking Placard # (if any)     Full Name of Person with Disability (If minor, complete Part 2 also)    Rommel Pierson  Male/Female  male  Date of Birth   10/15/1950    Valid [place] ’s License or ID Card # of Applicant                                                                                                  [place] Address  [address] [facility] [facility] [address] [facility] [address]    Mailing Address if Different from Above   Telephone Number  [phone] (home)  Email Address   [email or web address]  ? Yes/No  No      Signature of Person with Disability Today’s Date  8/21/2024     PART 2:   For Parent or Legal Guardian (MUST have a valid 's license and/or ID card)  I hereby certify that the above applicant is a minor and I have primary responsibility for his/her transportation. By affixing my signature below, I understand that the disability placard is issued to the person with the disability and may not be used unless I am transporting the disabled person in the vehicle.     Name of Parent or Legal Guardian   Relationship to Person with Disability   Valid [place] ’s License or ID Card #          [place] Address Apt/Unit# City State Zip   Telephone Number Email Address   Signature of Parent or Legal Guardian Today’s Date  8/21/2024     Warning: Any misuse of the disability parking placard/plates or making a false application may result in the revocation of the placard, a 12-month suspension or revocation of your drivers license, and a fine of up to $1,000.    Temporary Disabled Parking Placard Applications -- May be taken to any [facility] facility or mailed in.  Permanent Disables Parking Placard Applications -- MUST be mailed to the following address:  , Persons with Disabilities Placard Unit, [address] [place] [address], [address], [place] [address].    *If you have a permanent disability placard and would like a Persons with Disabilities License Plate, please visit your local  facility to apply. You will need your permanent placard number and current plate number or SEBASTIAN.     Please complete Page 2 to ensure timely processing.    Printed by authority of the [facility]. July 2021 -- 1 -- VSD 62.28          Part 3: Medical Eligibility Standards and Medical Professionals Certification  As the medical professional(s) executing this document and verifying the nature of the applicant's disability, I understand that making a false representation of a person's disability for  the purposes of obtaining any type of a disabled parking placard may result in suspension or revocation of my license and a fine of up to $1,000. As a licensed physician, advanced practice nurse, optometrist, chiropractor or physician's assistant, I certify the applicant has a condition that constitutes him/her as a person with disabilities.   Length of Disability: (Check one)   []   Temporary Disability; the duration of this disability is _____________________ (maximum 6 months)  [x]   Permanent Disability  []   Meter-Exempt Disability (Must complete and sign Part 4 also.)  Check all that apply (MUST check at least one):  []  Is restricted by a lung disease to such a degree that the person’s forced (respiratory) expiratory volume (FEV) for 1 second, when measured by spirometry, is less than 1 liter.  []   Uses a portable oxygen device.  []   Has a Class III or Class IV cardiac condition according to the standards set by the American Heart Association.  [x]   Cannot walk without the use of or assistance from a wheelchair, a walker, a crutch, a brace, a prosthetic device or another person.  [x]   Is severely limited in the ability to walk due to an arthritic, neurological, oncological or orthopedic condition.  [x]   Cannot walk 200 feet without stopping to rest because of one of the above five conditions.  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     Medical Professional’s Printed Name*   Jhon Paula MD Specialty  Internal  Medicine   Office Address   [facility], [address] [place] [address]  Dept: [phone]  Dept Fax: [phone]   Medical Professional’s Signature   State Professional License Number (NOT NPI#)   [id number] Today’s Date                  8/21/2024    Signature of Collaborating/Supervising Physician (if signed above by resident/assistant) Supervising State Professional License Number             PART 4: Medical Eligibility for Meter-Exempt Parking  The meter-exempt parking certification must be completed only when the applicant qualifies. To qualify, the applicant MUST have a VALID  [place] 's license, have an ambulatory disability described in Part 3, and also have one of the following conditions listed below.  Economic need is not a consideration for meter-exempt parking    The applicant is eligible for meter-exempt parking as provided by statue due to the following PERMANENT medical condition or disability:    Check all that apply:  [] Cannot manage, manipulate, or insert coins, or obtain tickets in parking meters/ticket machines due to lack of fine motor control of BOTH hands.  [] Cannot reach above his/her head to a height of 42 inches from the ground due to a lack of finger, hand or upper-extremity strength or mobility.  [] Cannot approach a parking meter due to his/her use of a wheelchair or other device for mobility.  [] Cannot walk more than 20 feet due to an orthopedic, neurological, cardiovascular, or lung condition in which the degree of debilitation is so severe that it almost completely impedes the ability to walk.  [] Missing a hand(s) or arm(s) or has permanently lost the use of a hand or arm.  [] Patient is under 18 years of age and incapable of driving.     Medical Professional’s Signature State Professional License Number (NOT NPI#)   Today’s Date                  8/21/2024    Signature of Collaborating/Supervising Physician (if  signed above by resident/assistant) Supervising State Professional License Number     FOR  OFFICE USE ONLY     Parking Placard Number:  Expiration Date:   Issued By: Issued Date:

## (undated) NOTE — MR AVS SNAPSHOT
19 [facility] 66, [facility] 34 [facility]               Thank you for choosing us for your health care visit with Meena Pfeiffer PT.   We are glad to serve you and happy to provide you with this summary of your Visit [facility] online at  [email or web address]

## (undated) NOTE — MR AVS SNAPSHOT
19 [facility] 66, [facility] 34 [facility]               Thank you for choosing us for your health care visit with Madan Chapman PT.   We are glad to serve you and happy to provide you with this summary of your Take 1 tablet by mouth every 6 (six) hours as needed for Pain. Commonly known as:  NORCO           MULTI-VITAMIN Tabs   Take 1 tablet by mouth daily. naproxen 250 MG Tabs   Take 250 mg by mouth as needed.    Commonly known as:  NAPROSYN

## (undated) NOTE — MR AVS SNAPSHOT
19 [facility] 66, [facility] 34 [facility]               Thank you for choosing us for your health care visit with Janes Nolan PT.   We are glad to serve you and happy to provide you with this summary of your office, you can view your past visit information in [employer] by going to Visits < Visit Summaries. [employer] questions? Call [phone] for help. [employer] is NOT to be used for urgent needs. For medical emergencies, dial 911.            Visit [facility]

## (undated) NOTE — MR AVS SNAPSHOT
19 [facility] 66, [facility] 34 [facility]               Thank you for choosing us for your health care visit with Janes Nolan PT.   We are glad to serve you and happy to provide you with this summary of your Take 1-2 tablets every 4-6 hours as needed for pain. Commonly known as:  NORCO           MULTI-VITAMIN Tabs   Take 1 tablet by mouth daily. naproxen 250 MG Tabs   Take 250 mg by mouth as needed.    Commonly known as:  NAPROSYN

## (undated) NOTE — MR AVS SNAPSHOT
19 [facility] 66, [facility] 34 [facility]               Thank you for choosing us for your health care visit with Linton Moritz, PT.   We are glad to serve you and happy to provide you with this summary of your HYDROcodone-acetaminophen 5-325 MG Tabs   Take 1-2 tablets every 4-6 hours as needed for pain. Commonly known as:  NORCO           MULTI-VITAMIN Tabs   Take 1 tablet by mouth daily. naproxen 250 MG Tabs   Take 250 mg by mouth as needed.    Comm

## (undated) NOTE — LETTER
[facility]     [x] NEW APPLICANT  [address] [place] [address]  [] RENEWAL            Persons with Disabilities Certification for Parking Placard  *This form is valid for three months from your physician's signature date for a Temporary Placard and six months for a Permanent Placard.    NOTE TO ALL DISABILITY LICENSE PLATE OWNERS:  If you have a disability license plate, you MUST complete the form and renew your placard.    DIRECTIONS: Both sides of this document must be signed and completed fully. All fields are required.   Applicants complete Part 1. If the applicant is a MINOR, then Parent/Guardian(s) MUST also complete Part 2. The applicant's medical professional MUSTcomplete Part 3. If the applicant is applying for meter-exempt parking, his/her medical professional MUST also complete Part 4.    PART 1:   Applicant Information (MUST have a valid [place] 's license and/or ID card)  I hereby certify  that I meet the definition of a person with a disability as provided in [address] [place] [address] 5/1-159.1, and I certify  that my physical condition entitles me to the issuance of a Persons with Disabilities Parking Placard. By affixing my signature below, I understand that the parking placard may not be used unless I am the  or passenger of the vehicle.     *If a  , please provide a copy of your  showing proof of service.     Disability Parking Placard # (if any)     Full Name of Person with Disability (If minor, complete Part 2 also)    Rommel Pierson  Male/Female  male  Date of Birth   10/15/1950    Valid [place] ’s License or ID Card # of Applicant                                                                                                  [place] Address  [address] [facility] [address] [facility] [address]    Mailing Address if Different from Above   Telephone Number  [phone] (home)  Email Address   [email or web address]  ? Yes/No  No      Signature of Person with Disability Today’s Date  4/24/2024     PART 2:   For Parent or Legal Guardian (MUST have a valid 's license and/or ID card)  I hereby certify that the above applicant is a minor and I have primary responsibility for his/her transportation. By affixing my signature below, I understand that the disability placard is issued to the person with the disability and may not be used unless I am transporting the disabled person in the vehicle.     Name of Parent or Legal Guardian   Relationship to Person with Disability   Valid [place] ’s License or ID Card #          [place] Address Apt/Unit# City State Zip   Telephone Number Email Address   Signature of Parent or Legal Guardian Today’s Date  4/24/2024     Warning: Any misuse of the disability parking placard/plates or making a false application may result in the revocation of the placard, a 12-month suspension or revocation of your drivers license, and a fine of up to $1,000.    Temporary Disabled Parking Placard Applications -- May be taken to any [facility] facility or mailed in.  Permanent Disables Parking Placard Applications -- MUST be mailed to the following address:  , Persons with Disabilities Placard Unit, [address] [place] [address], [address], [place] [address].    *If you have a permanent disability placard and would like a Persons with Disabilities License Plate, please visit your local  facility to apply. You will need your permanent placard number and current plate number or SEBASTIAN.     Please complete Page 2 to ensure timely processing.    Printed by authority of the [facility]. July 2021 -- 1 -- VSD 62.28          Part 3: Medical Eligibility Standards and Medical Professionals Certification  As the medical professional(s) executing this document and verifying the nature of the applicant's disability, I understand that making a false representation of a person's disability for  the purposes of obtaining any type of a disabled parking placard may result in suspension or revocation of my license and a fine of up to $1,000. As a licensed physician, advanced practice nurse, optometrist, chiropractor or physician's assistant, I certify the applicant has a condition that constitutes him/her as a person with disabilities.   Length of Disability: (Check one)   []   Temporary Disability; the duration of this disability is ____________________ (maximum 6 months)  [x]   Permanent Disability  []   Meter-Exempt Disability (Must complete and sign Part 4 also.)  Check all that apply (MUST check at least one):  []  Is restricted by a lung disease to such a degree that the person’s forced (respiratory) expiratory volume (FEV) for 1 second, when measured by spirometry, is less than 1 liter.  []   Uses a portable oxygen device.  []   Has a Class III or Class IV cardiac condition according to the standards set by the American Heart Association.  [x]   Cannot walk without the use of or assistance from a wheelchair, a walker, a crutch, a brace, a prosthetic device or another person.  [x]   Is severely limited in the ability to walk due to an arthritic, neurological, oncological or orthopedic condition.  [x]   Cannot walk 200 feet without stopping to rest because of one of the above five conditions.  Check all that apply: (MUST check at least one diagnosis):  []Amputation of extremity(s)_________________ [] Arthritis of the ______________________  []Spina Bifida     []Osteoarthritis of the __________________   []Multiple Sclerosis    [] Chronic Pain due to ___________________  []Quadriplegia/Paraplegia   [] Legally Blind with limited mobility  [] Cerebral Palsy  [x] Other Diagnosis: avascular necrosis of left hip   If none of the above conditions apply, list the medical condition that impacts the person’s mobility.     Medical Professional’s Printed Name*   Jhon Paula MD Specialty  Internal medicine   Office  Address   [facility], [address] [place] [address]  Dept: [phone]  Dept Fax: [phone]   Medical Professional’s Signature   State Professional License Number (NOT NPI#)    Today’s Date                  4/24/2024    Signature of Collaborating/Supervising Physician (if signed above by resident/assistant) Supervising State Professional License Number             PART 4: Medical Eligibility for Meter-Exempt Parking  The meter-exempt parking certification must be completed only when the applicant qualifies. To qualify, the applicant MUST have a VALID  [place] 's license, have an ambulatory disability described in Part 3, and also have one of the following conditions listed below.  Economic need is not a consideration for meter-exempt parking    The applicant is eligible for meter-exempt parking as provided by statue due to the following PERMANENT medical condition or disability:    Check all that apply:  [] Cannot manage, manipulate, or insert coins, or obtain tickets in parking meters/ticket machines due to lack of fine motor control of BOTH hands.  [] Cannot reach above his/her head to a height of 42 inches from the ground due to a lack of finger, hand or upper-extremity strength or mobility.  [] Cannot approach a parking meter due to his/her use of a wheelchair or other device for mobility.  [] Cannot walk more than 20 feet due to an orthopedic, neurological, cardiovascular, or lung condition in which the degree of debilitation is so severe that it almost completely impedes the ability to walk.  [] Missing a hand(s) or arm(s) or has permanently lost the use of a hand or arm.  [] Patient is under 18 years of age and incapable of driving.     Medical Professional’s Signature State Professional License Number (NOT NPI#)   Today’s Date                  4/24/2024    Signature of Collaborating/Supervising Physician (if signed above by  resident/assistant) Supervising State Professional License Number     FOR  OFFICE USE ONLY     Parking Placard Number:  Expiration Date:   Issued By: Issued Date:

## (undated) NOTE — MR AVS SNAPSHOT
19 [facility] 66, [facility] 34 [facility]               Thank you for choosing us for your health care visit with Veena aBez PT.   We are glad to serve you and happy to provide you with this summary of your Take 1-2 tablets every 4-6 hours as needed for pain. Commonly known as:  NORCO           MULTI-VITAMIN Tabs   Take 1 tablet by mouth daily. naproxen 250 MG Tabs   Take 250 mg by mouth as needed.    Commonly known as:  NAPROSYN

## (undated) NOTE — LETTER
[facility]     [x] NEW APPLICANT  [address] [place] [id number]  [] RENEWAL            Persons with Disabilities Certification for Parking Placard  *This form is valid for three months from your physician's signature date for a Temporary Placard and six months for a Permanent Placard.    NOTE TO ALL DISABILITY LICENSE PLATE OWNERS:  If you have a disability license plate, you MUST complete the form and renew your placard.    DIRECTIONS: Both sides of this document must be signed and completed fully. All fields are required.   Applicants complete Part 1. If the applicant is a MINOR, then Parent/Guardian(s) MUST also complete Part 2. The applicant's medical professional MUSTcomplete Part 3. If the applicant is applying for meter-exempt parking, his/her medical professional MUST also complete Part 4.    PART 1:   Applicant Information (MUST have a valid [place] 's license and/or ID card)  I hereby certify  that I meet the definition of a person with a disability as provided in [address] [place] [address] 5/1-159.1, and I certify  that my physical condition entitles me to the issuance of a Persons with Disabilities Parking Placard. By affixing my signature below, I understand that the parking placard may not be used unless I am the  or passenger of the vehicle.     *If a  , please provide a copy of your  showing proof of service.     Disability Parking Placard # (if any)     Full Name of Person with Disability (If minor, complete Part 2 also)    Rommel Pierson  Male/Female  male  Date of Birth   10/15/1950    Valid [place] ’s License or ID Card # of Applicant                                                                                                  [place] Address  [address] [address]    Mailing Address if Different from Above   Telephone Number  [phone] (home)  Email Address   [email or web address]  ? Yes/No  No      Signature of Person with Disability Today’s Date  4/22/2024     PART 2:   For Parent or Legal Guardian (MUST have a valid 's license and/or ID card)  I hereby certify that the above applicant is a minor and I have primary responsibility for his/her transportation. By affixing my signature below, I understand that the disability placard is issued to the person with the disability and may not be used unless I am transporting the disabled person in the vehicle.     Name of Parent or Legal Guardian   Relationship to Person with Disability   Valid [place] ’s License or ID Card #          [place] Address Apt/Unit# City State Zip   Telephone Number Email Address   Signature of Parent or Legal Guardian Today’s Date  4/22/2024     Warning: Any misuse of the disability parking placard/plates or making a false application may result in the revocation of the placard, a 12-month suspension or revocation of your drivers license, and a fine of up to $1,000.    Temporary Disabled Parking Placard Applications -- May be taken to any [facility] facility or mailed in.  Permanent Disables Parking Placard Applications -- MUST be mailed to the following address:  , Persons with Disabilities Placard Unit, [address] [place] [address], [address], [place] [address].    *If you have a permanent disability placard and would like a Persons with Disabilities License Plate, please visit your local  facility to apply. You will need your permanent placard number and current plate number or SEBASTIAN.     Please complete Page 2 to ensure timely processing.    Printed by authority of the [facility]. July 2021 -- 1 -- VSD 62.28          Part 3: Medical Eligibility Standards and Medical Professionals Certification  As the medical professional(s) executing this document and verifying the nature of the applicant's disability, I understand that making a false representation of a person's disability for  the purposes of obtaining any type of a disabled parking placard may result in suspension or revocation of my license and a fine of up to $1,000. As a licensed physician, advanced practice nurse, optometrist, chiropractor or physician's assistant, I certify the applicant has a condition that constitutes him/her as a person with disabilities.   Length of Disability: (Check one)   [x]   Temporary Disability; the duration of this disability is ____six months_______________ (maximum 6 months)  []   Permanent Disability  []   Meter-Exempt Disability (Must complete and sign Part 4 also.)  Check all that apply (MUST check at least one):  []  Is restricted by a lung disease to such a degree that the person’s forced (respiratory) expiratory volume (FEV) for 1 second, when measured by spirometry, is less than 1 liter.  []   Uses a portable oxygen device.  []   Has a Class III or Class IV cardiac condition according to the standards set by the American Heart Association.  []   Cannot walk without the use of or assistance from a wheelchair, a walker, a crutch, a brace, a prosthetic device or another person.  []   Is severely limited in the ability to walk due to an arthritic, neurological, oncological or orthopedic condition.  []   Cannot walk 200 feet without stopping to rest because of one of the above five conditions.  Check all that apply: (MUST check at least one diagnosis):  []Amputation of extremity(s)_________________ [x] Arthritis of the ____left hip_________________  []Spina Bifida     []Osteoarthritis of the __________________   []Multiple Sclerosis    [] Chronic Pain due to ___________________  []Quadriplegia/Paraplegia   [] Legally Blind with limited mobility  [] Cerebral Palsy  [x] Other Diagnosis: ________avascular necrosis of left hip___________________________________    If none of the above conditions apply, list the medical condition that impacts the person’s mobility.     Medical Professional’s Printed Name*    Jhon Paula MD Specialty  Internal medicine    Office Address   [facility], [address] [place] [id number]  Dept: [phone]  Dept Fax: [phone]   Medical Professional’s Signature   State Professional License Number (NOT NPI#)    Today’s Date                  4/22/2024    Signature of Collaborating/Supervising Physician (if signed above by resident/assistant) Supervising State Professional License Number             PART 4: Medical Eligibility for Meter-Exempt Parking  The meter-exempt parking certification must be completed only when the applicant qualifies. To qualify, the applicant MUST have a VALID  [place] 's license, have an ambulatory disability described in Part 3, and also have one of the following conditions listed below.  Economic need is not a consideration for meter-exempt parking    The applicant is eligible for meter-exempt parking as provided by statue due to the following PERMANENT medical condition or disability:    Check all that apply:  [] Cannot manage, manipulate, or insert coins, or obtain tickets in parking meters/ticket machines due to lack of fine motor control of BOTH hands.  [] Cannot reach above his/her head to a height of 42 inches from the ground due to a lack of finger, hand or upper-extremity strength or mobility.  [] Cannot approach a parking meter due to his/her use of a wheelchair or other device for mobility.  [] Cannot walk more than 20 feet due to an orthopedic, neurological, cardiovascular, or lung condition in which the degree of debilitation is so severe that it almost completely impedes the ability to walk.  [] Missing a hand(s) or arm(s) or has permanently lost the use of a hand or arm.  [] Patient is under 18 years of age and incapable of driving.     Medical Professional’s Signature State Professional License Number (NOT NPI#)   Today’s Date                  4/22/2024    Signature of  Collaborating/Supervising Physician (if signed above by resident/assistant) Supervising State Professional License Number     FOR  OFFICE USE ONLY     Parking Placard Number:  Expiration Date:   Issued By: Issued Date:

## (undated) NOTE — MR AVS SNAPSHOT
19 [facility], [facility]               Thank you for choosing us for your health care visit with Josue Hair PT.   We are glad to serve you and happy to provide you with this summary of your Commonly known as:  [id number] can access your MyChart to more actively manage your health care and view more details from this visit by going to [email or web address].   If you've recently had a stay at

## (undated) NOTE — ED AVS SNAPSHOT
[facility] [address] [facility] [address]    Phone:  [phone]    Fax:  [phone]           Evaristo Patterson   MRN: [id number]    Department:  [facility]   Date of Visit:  1 To Check ER Wait Times:  TEXT 'ERwait' to [address]      Click [email or web address]      Or call [phone]    If you have any problems with your follow-up, please call our  at [phone].     Si usted tiene algun problema con escalante sequimiento, por I have read and understand the instructions given to me by my caregivers. 24-Hour Pharmacies        Pharmacy Address Phone Number   [address] [facility] ([address]) [address] [place] [address].  [facility] & CONCLUSION:  No acute fracture or other acute bony process.            Dictated by: Angela Horton MD on 1/04/2017 at 10:30       Approved by: Angela Horton MD              Narrative:    PROCEDURE:  XR SHOULDER, COMPLETE (MIN 2 VIEWS), RIGHT (CPT=73030)

## (undated) NOTE — MR AVS SNAPSHOT
19 [facility] 66, [facility] 34 [facility]               Thank you for choosing us for your health care visit with Muriel Chung PT.   We are glad to serve you and happy to provide you with this summary of your Summaries. If you've been to the Emergency Department or your doctor's office, you can view your past visit information in [employer] by going to Visits < Visit Summaries. [employer] questions? Call [phone] for help.   [employer] is NOT to be used for urge

## (undated) NOTE — MR AVS SNAPSHOT
19 [facility] 66, [facility] 34 [facility]               Thank you for choosing us for your health care visit with Allison Bueno PT.   We are glad to serve you and happy to provide you with this summary of your Take 250 mg by mouth as needed. Commonly known as:  [id number] can access your MyChart to more actively manage your health care and view more details from this visit by going to [email or web address].

## (undated) NOTE — IP AVS SNAPSHOT
[facility] [place] [address] [place], [address] ~ [phone]                Discharge Summary   1/31/2017    Lucho Chu           Admission Information        Provider Department    1/31/2017 Digna Leung MD  [facility] / Gideon Christiansen 4. Keep shoulder immobilizer will be worn for 4 weeks. You may loosen the wrist strap and straighten your arm intermittently when awake. 5. A prescription will be provided for a pain medication before discharge. Take the pain medicine with food.  You may · Please report any suspected allergic reactions or bleeding issues to your doctor  You have been given a prescription for Clement Siad was Given to you at:  Next dose due:     Take this medication as directed  This medication contains Tylenol (acetam 1/31/2017  8:00 AM  Pre-Op / [facility] [phone]         We want to hear from you       We want to hear from you, please share your experience with us by returning the survey you will receive in the mail. Thank you!         MyChart     Visit MyChart  You

## (undated) NOTE — MR AVS SNAPSHOT
[address]               Thank you for choosing us for your health care visit with Alysha Muller PT.   We are glad to serve you and happy to provide you with this summary of your If you've recently had a stay at the Hospital you can access your discharge instructions in [employer] by going to Visits < Admission Summaries.  If you've been to the Emergency Department or your doctor's office, you can view your past visit information in My Visit [facility] online at  [email or web address]

## (undated) NOTE — MR AVS SNAPSHOT
19 [facility] 66, [facility] 34 [facility]               Thank you for choosing us for your health care visit with Mike Liang PT.   We are glad to serve you and happy to provide you with this summary of your Take 1 tablet by mouth every 6 (six) hours as needed for Pain. Commonly known as:  NORCO           MULTI-VITAMIN Tabs   Take 1 tablet by mouth daily. naproxen 250 MG Tabs   Take 250 mg by mouth as needed.    Commonly known as:  NAPROSYN